# Patient Record
Sex: FEMALE | Race: WHITE | NOT HISPANIC OR LATINO | Employment: PART TIME | URBAN - METROPOLITAN AREA
[De-identification: names, ages, dates, MRNs, and addresses within clinical notes are randomized per-mention and may not be internally consistent; named-entity substitution may affect disease eponyms.]

---

## 2017-01-04 ENCOUNTER — APPOINTMENT (OUTPATIENT)
Dept: ONCOLOGY | Facility: HOSPITAL | Age: 47
End: 2017-01-04

## 2017-01-18 ENCOUNTER — INFUSION (OUTPATIENT)
Dept: ONCOLOGY | Facility: HOSPITAL | Age: 47
End: 2017-01-18

## 2017-01-18 ENCOUNTER — OFFICE VISIT (OUTPATIENT)
Dept: GYNECOLOGIC ONCOLOGY | Facility: CLINIC | Age: 47
End: 2017-01-18

## 2017-01-18 VITALS
BODY MASS INDEX: 30.34 KG/M2 | RESPIRATION RATE: 16 BRPM | SYSTOLIC BLOOD PRESSURE: 144 MMHG | WEIGHT: 188 LBS | HEART RATE: 78 BPM | OXYGEN SATURATION: 94 % | DIASTOLIC BLOOD PRESSURE: 90 MMHG | TEMPERATURE: 96.7 F

## 2017-01-18 DIAGNOSIS — C56.1 OVARIAN CANCER, RIGHT (HCC): Primary | ICD-10-CM

## 2017-01-18 LAB — CANCER AG125 SERPL QL: 28.8 U/ML (ref 0–30.2)

## 2017-01-18 PROCEDURE — 96523 IRRIG DRUG DELIVERY DEVICE: CPT

## 2017-01-18 PROCEDURE — 25010000002 HEPARIN FLUSH (PORCINE) 100 UNIT/ML SOLUTION: Performed by: OBSTETRICS & GYNECOLOGY

## 2017-01-18 PROCEDURE — 36591 DRAW BLOOD OFF VENOUS DEVICE: CPT

## 2017-01-18 PROCEDURE — 99213 OFFICE O/P EST LOW 20 MIN: CPT | Performed by: OBSTETRICS & GYNECOLOGY

## 2017-01-18 RX ORDER — VENLAFAXINE HYDROCHLORIDE 75 MG/1
75 CAPSULE, EXTENDED RELEASE ORAL DAILY
Qty: 30 CAPSULE | Refills: 3 | Status: SHIPPED | OUTPATIENT
Start: 2017-01-18 | End: 2017-05-10 | Stop reason: SDUPTHER

## 2017-01-18 RX ORDER — 0.9 % SODIUM CHLORIDE 0.9 %
10 VIAL (ML) INJECTION AS NEEDED
Status: CANCELLED | OUTPATIENT
Start: 2017-01-18

## 2017-01-18 RX ADMIN — HEPARIN 500 UNITS: 100 SYRINGE at 10:45

## 2017-01-18 NOTE — PROGRESS NOTES
Subjective     Reason for visit:  Surveillance visit    History of present illness:  The patient is a 46 y.o. 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  Her treatment history is summarized below.  She presents today for a surveillance visit.      Overall she has done well.  She denies abdominal pain and bloating.  No vaginal bleeding or discharge.  Denies nausea and vomiting.  Denies chest pain and SOB.  No fevers or chills.  She denies bowel or bladder dysfunction.  Her weight and appetite have been stable.  Her energy is stable.  No neuropathy.  No changes in her skin.  No issues with her port a cath other than minimal discomfort.  She has been coping well.  She continues to smoke 1/2 ppd.  Still having hot flashes.      Treatment History:    1.  She presented after having had leukocytosis for approximately 4-6 weeks and having received several courses of antibiotics as an outpatient for presumed pelvic etiology.  She also reported ongoing emesis since 12/2015.    2.  She was admitted the day prior to her planned gynecologic debulking surgery for anti-emetics and IV fluids.  Her leukocytosis on admission was 18.    3.  On 4/11/16 she underwent a laparotomy and debulking surgery for ovarian cancer.  Her surgery included a total abdominal hysterectomy, bilateral salpingoophorectomy, bilateral pelvic lymph node dissection, bilateral periaortic lymph node dissection, omentectomy, appendectomy, tumor debulking and peritoneal stripping to no gross residual visible disease.  She had a cholecystectomy performed by Dr. Leslie.  Intraoperatively she had surgical spill of her large right-sided ovarian mass.    4.  Final surgical pathology showed a 20 x 20 x 12.5 cm mucinous adenocarcinoma of the right ovary.  Her tumor was grade 2.  Given the intraoperative rupture, the stage is 1C1.  Washings collected prior to rupture were negative for malignant cells.  There was no ovarian surface involvement,  adhesions sampled to the sigmoid colon were negative.  There was no LVSI.  Right pelvic lymph nodes were 0/8, left pelvic lymph nodes 0/4, right periaortic lymph nodes 0/1, and 0/2 left periaortic lymph nodes.  The omentum was negative.  The appendix was negative.  The gallbladder showed chronic cholecystitis with with cholelithiasis.    5.  Postoperatively her white blood cell count slowly trended down.  She was discharged home on a course of Cipro and Flagyl.    6.  She underwent Port-A-Cath placement with Sony Xiao and Abel on 16.  Thereafter she was seen for mild superficial cellulitis at her port site.    7.  On 16 she received her first cycle of carboplatin, paclitaxel dosed at an every 3 week interval.  Her sixth and final cycle was on 16.  8.  Post-treatment CT scan at the end of 2016 showed only a small node in the left external iliac chain not meeting required criteria for malignant adenopathy and unchanged from pre-op scan.    OBGYN History:  status post one normal spontaneous vaginal delivery    Past Medical History   Diagnosis Date   • Abdominal pain    • Anxiety    • BMI 29.0-29.9,adult      BMI > = 18 - 29.6   • Chronic narcotic use      Chronic neck pain.  Dr. Mian Somers.   • Chronic neck pain    • Current every day smoker    • Dehydration    • Drug therapy    • Emesis 2016   • GERD (gastroesophageal reflux disease)    • Leukocytosis 2016   • Ovarian cancer 2016     Stage 1C1 mucinous adenocarcinoma     Past Surgical History   Procedure Laterality Date   • Total abdominal hysterectomy with salpingo oophorectomy Bilateral 2016     Ovarian cancer debulking   • Cholecystectomy open  2016   • Appendectomy  2016   • Venous access device (port) insertion Left 2016   • Mandible fracture surgery       jaw broke and reset     MEDICATIONS: The current medication list was reviewed with the patient and updated in the EMR this date per the Medical  Assistant. Medication dosages and frequencies were confirmed to be accurate.      Allergies:  has No Known Allergies.    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  She lives approximately an hour away.  She has a boyfriend and together they take care of 3 young children.  She continues to smoke half a pack a day.  She denies use of alcohol, and illicit drugs.     Family History:  Denies a history of breast, colon, endometrial, ovarian, and prostate cancer.  No history of melanoma.    Health Maintenance:    1. Mammogram - 10/2016  2. Colonoscopy - never    Review of Systems:  CONSTITUTIONAL:  Weight has been stable, no excessive fatigue.  No fever, chills, night sweats.  PSYCHIATRIC: +anxiety.  No history of depression. No bipolar disorder or insomnia.  EYES: Vision is unchanged.  No photophobia.  EARS, NOSE, MOUTH, AND THROAT: Hearing normal. No swallowing difficulties.  No sore throat.  ENDOCRINE: No history of diabetes or thyroid disease.  LYMPHATIC: No enlarged lymph nodes  RESPIRATORY: No shortness of breath, cough, asthma or wheezing.  No hemoptysis.  CARDIOVASCULAR: No angina, orthopnea, or edema.  No HTN.  No hyperlipidemia.  GASTROINTESTINAL: No constipation, diarrhea, or melena.  No reflux.  No nausea, vomiting.  GENITOURINARY: No dysuria, hematuria, urgency, or frequency.  NEUROLOGIC: +sciatica.  No numbness, weakness, motor disturbance, syncope, seizure, or headaches.  MUSCULOSKELETAL: No joint pain.  No muscle weakness.  INTEGUMENTARY: No new skin lesions.  GYNECOLOGIC: +hot flashes.  Per history of present illness.  HEMATOLOGIC: No bleeding problems.  Denies easy bruising.    Objective      Physical Exam  Visit Vitals   • /90   • Pulse 78   • Temp 96.7 °F (35.9 °C) (Temporal Artery )   • Resp 16   • Wt 188 lb (85.3 kg)   • LMP  (Approximate)   • SpO2 94%   • BMI 30.34 kg/m2     Body mass index is 30.34 kg/(m^2).    Wt Readings  from Last 3 Encounters:   01/18/17 188 lb (85.3 kg)   12/22/16 185 lb (83.9 kg)   11/17/16 185 lb (83.9 kg)     ECOG PS 0    GENERAL: Alert, well-appearing female in no apparent distress.  She appears her stated age.  She is here by herself.  HEENT: Sclera anicteric, normal eyelids. Head normocephalic, atraumatic. Mucus membranes moist.  Normal dentition.    NECK: Trachea midline, supple, without masses.  No thyromegaly.      BREASTS: Deferred  CHEST: PAC c/d/i   CARDIOVASCULAR: Normal rate, regular rhythm, no murmurs, rubs, or gallops.  Extremities symmetric.  No peripheral edema.  RESPIRATORY: Clear to auscultation bilaterally, normal respiratory effort  GASTROINTESTINAL:  Soft, non-tender, non-distended, no rebound or guarding, no masses, or hernias.  No HSM.  Incisions well-healed midline vertical laparotomy incision  MUSCULOSKELETAL:  Normal gait and station.  FROMx4.  No digital cyanosis.    SKIN:  Warm, dry, well-perfused.  All visible areas intact.  No rashes, lesions, ulcers.  PSYCHIATRIC: AO x3, with appropriate affect, normal thought processes  LYMPHATICS:  No cervical or inguinal adenopathy noted.  PELVIC exam:  Normal external female genitalia without lesions or skin changes.  Urethra midline.  Vagina without lesions.  Cuff intact, well-healed, and without visible lesions.  On bimanual exam vagina and cuff are smooth and without nodularity.  No palpable abdominal masses. Rectovaginal exam confirmatory. The pelvic sidewalls are smooth, the cul de sac is clear, the rectovaginal septum is supple.    Diagnostic Data:    Lab Results   Component Value Date     23.3 11/17/2016     23.1 08/31/2016     24.6 08/05/2016     30.4 (H) 07/20/2016     27.6 06/29/2016       Assessment/Plan  This is a 46 y.o. 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  Ovarian cancer: No evidence of disease.   today.  We previously discussed the  typical recommendation for ovarian cancer surveillance are every 3 months for the two years, every 4-6 months for for the third year, every 6 months for years 3-5, and annually thereafter.  We discussed need to monitor symptoms including any abdominal pain, bloating, changes in bowel or bladder habits.  She knows to be particularly aware of symptoms that occur several times a week for at least several weeks.  We also discussed the role of physical exam, CA-125, and imaging.     2.  Hot flashes: Change to Effexor XR and observe     3.  Smoking cessation: I discuss this at every visit.  She continues to smoke.  We discussed various strategies and alternatives coping mechanisms.    4.  Genetics:  Referral previously placed to Genetic Counseling.  She understands my recommendation to have this performed    5.  Routine health maintenance: Up to date on mammogram.  I will also perform clinical breast exams going forward, ideally off cycle from her mammogram.    6.  Port a cath:  Will remain in place for at least the first year.  She will have regularly scheduled port flushes.    7.  Follow-up: RTC in 3 months.  Eventually refer for survivorship.  She knows to call in the interim for any questions or concerns.          Electronically Signed by: Glenys Roman MD  Date: 1/18/2017      Time:  10:33 AM

## 2017-01-18 NOTE — MR AVS SNAPSHOT
Gertrudis Yost   1/18/2017 9:30 AM   Office Visit    Dept Phone:  [phone]   Encounter #:  [id number]    Provider:  Glenys Roman MD   Department:  [facility] GYNECOLOGIC ONCOLOGY                Your Full Care Plan              Today's Medication Changes          These changes are accurate as of: 1/18/17 10:07 AM.  If you have any questions, ask your nurse or doctor.               New Medication(s)Ordered:     venlafaxine XR 75 MG 24 hr capsule   Commonly known as:  EFFEXOR XR   Take 1 capsule by mouth Daily.   Replaces:  venlafaxine 37.5 MG tablet   Started by:  Glenys Roman MD         Stop taking medication(s)listed here:     venlafaxine 37.5 MG tablet   Commonly known as:  EFFEXOR   Replaced by:  venlafaxine XR 75 MG 24 hr capsule   Stopped by:  Glenys Roman MD                Where to Get Your Medications      These medications were sent to [address] AT  & [facility] - [phone] [facility] [phone] [address] [address]     Phone:  [phone]     venlafaxine XR 75 MG 24 hr capsule                  Your Updated Medication List          This list is accurate as of: 1/18/17 10:07 AM.  Always use your most recent med list.                gabapentin 400 MG capsule   Commonly known as:  NEURONTIN       lidocaine-prilocaine 2.5-2.5 % cream   Commonly known as:  EMLA       Morphine 15 MG 12 hr tablet   Commonly known as:  MS CONTIN       oxyCODONE-acetaminophen  MG per tablet   Commonly known as:  PERCOCET       venlafaxine XR 75 MG 24 hr capsule   Commonly known as:  EFFEXOR XR   Take 1 capsule by mouth Daily.               You Were Diagnosed With        Codes Comments    Ovarian cancer, right    -  Primary ICD-10-CM: C56.1  ICD-9-CM: 183.0       Instructions     None    Patient Instructions History      Upcoming Appointments     Visit Type Date Time  Department    FOLLOW UP 1 UNIT 2017  9:30 AM [facility]    PORT FLUSH 2017 10:30 AM [facility]    PORT FLUSH 2/15/2017  8:30 AM [facility]    FOLLOW UP 1 UNIT 2017  9:30 AM [facility]      MyChart Signup     [facility] [employer] allows you to send messages to your doctor, view your test results, renew your prescriptions, schedule appointments, and more. To sign up, go to [employer] and click on the Sign Up Now link in the New User? box. Enter your [employer] Activation Code exactly as it appears below along with the last four digits of your Social Security Number and your Date of Birth () to complete the sign-up process. If you do not sign up before the expiration date, you must request a new code.    [employer] Activation Code: [email or web address]  Expires: 2017 10:07 AM    If you have questions, you can email [email or web address] or call [phone] to talk to our [employer] staff. Remember, [employer] is NOT to be used for urgent needs. For medical emergencies, dial 911.               Other Info from Your Visit           Your Appointments     2017 10:30 AM EST   PORT FLUSH with CHAIR 13   [facility] OUTPATIENT ONCOLOGY ([place])    Cancer Center  [address] [place] [id number] [phone]            Feb 15, 2017  8:30 AM EST   PORT FLUSH with CHAIR 14   [facility] OUTPATIENT ONCOLOGY ([facility]    Cancer Center  [address] [place] [id number]   [phone]            2017  9:30 AM EDT   FOLLOW UP with Glenys Roman MD   [facility] GYNEC[facility]COL[facility]Y (--)    [address] [place] [id number]   [phone]              Allergies     No Known Allergies      Reason for Visit     Follow-up           Vital Signs     Blood Pressure Pulse Temperature Respirations Weight Last Menstrual Period    144/90 78  96.7 °F (35.9 °C) (Temporal Artery ) 16 188 lb (85.3 kg) (Approximate)    Oxygen Saturation Body Mass Index Smoking Status             94% 30.34 kg/m2 Current Every Day Smoker         Problems and Diagnoses Noted     Ovarian cancer

## 2017-01-19 ENCOUNTER — APPOINTMENT (OUTPATIENT)
Dept: ONCOLOGY | Facility: HOSPITAL | Age: 47
End: 2017-01-19

## 2017-02-15 ENCOUNTER — APPOINTMENT (OUTPATIENT)
Dept: ONCOLOGY | Facility: HOSPITAL | Age: 47
End: 2017-02-15

## 2017-02-15 ENCOUNTER — INFUSION (OUTPATIENT)
Dept: ONCOLOGY | Facility: HOSPITAL | Age: 47
End: 2017-02-15

## 2017-02-15 VITALS
RESPIRATION RATE: 16 BRPM | DIASTOLIC BLOOD PRESSURE: 91 MMHG | HEART RATE: 77 BPM | SYSTOLIC BLOOD PRESSURE: 132 MMHG | TEMPERATURE: 97.8 F

## 2017-02-15 DIAGNOSIS — C56.1 OVARIAN CANCER, RIGHT (HCC): Primary | ICD-10-CM

## 2017-02-15 PROCEDURE — 25010000002 HEPARIN FLUSH (PORCINE) 100 UNIT/ML SOLUTION: Performed by: OBSTETRICS & GYNECOLOGY

## 2017-02-15 PROCEDURE — 96523 IRRIG DRUG DELIVERY DEVICE: CPT

## 2017-02-15 RX ORDER — 0.9 % SODIUM CHLORIDE 0.9 %
10 VIAL (ML) INJECTION AS NEEDED
Status: CANCELLED | OUTPATIENT
Start: 2017-02-15

## 2017-02-15 RX ORDER — 0.9 % SODIUM CHLORIDE 0.9 %
10 VIAL (ML) INJECTION AS NEEDED
Status: DISCONTINUED | OUTPATIENT
Start: 2017-02-15 | End: 2017-02-15 | Stop reason: HOSPADM

## 2017-02-15 RX ADMIN — SODIUM CHLORIDE 10 ML: 9 INJECTION INTRAMUSCULAR; INTRAVENOUS; SUBCUTANEOUS at 08:58

## 2017-02-15 RX ADMIN — HEPARIN 500 UNITS: 100 SYRINGE at 08:58

## 2017-03-29 ENCOUNTER — INFUSION (OUTPATIENT)
Dept: ONCOLOGY | Facility: HOSPITAL | Age: 47
End: 2017-03-29

## 2017-03-29 VITALS
HEART RATE: 79 BPM | BODY MASS INDEX: 31.82 KG/M2 | RESPIRATION RATE: 16 BRPM | DIASTOLIC BLOOD PRESSURE: 84 MMHG | HEIGHT: 66 IN | TEMPERATURE: 97.6 F | SYSTOLIC BLOOD PRESSURE: 133 MMHG | WEIGHT: 198 LBS

## 2017-03-29 DIAGNOSIS — C56.1 OVARIAN CANCER, RIGHT (HCC): Primary | ICD-10-CM

## 2017-03-29 PROCEDURE — 25010000002 HEPARIN FLUSH (PORCINE) 100 UNIT/ML SOLUTION: Performed by: OBSTETRICS & GYNECOLOGY

## 2017-03-29 PROCEDURE — 96523 IRRIG DRUG DELIVERY DEVICE: CPT

## 2017-03-29 RX ORDER — 0.9 % SODIUM CHLORIDE 0.9 %
10 VIAL (ML) INJECTION AS NEEDED
Status: CANCELLED | OUTPATIENT
Start: 2017-03-29

## 2017-03-29 RX ADMIN — HEPARIN 500 UNITS: 100 SYRINGE at 08:29

## 2017-04-19 ENCOUNTER — APPOINTMENT (OUTPATIENT)
Dept: ONCOLOGY | Facility: HOSPITAL | Age: 47
End: 2017-04-19

## 2017-05-10 ENCOUNTER — INFUSION (OUTPATIENT)
Dept: ONCOLOGY | Facility: HOSPITAL | Age: 47
End: 2017-05-10

## 2017-05-10 ENCOUNTER — OFFICE VISIT (OUTPATIENT)
Dept: GYNECOLOGIC ONCOLOGY | Facility: CLINIC | Age: 47
End: 2017-05-10

## 2017-05-10 VITALS — DIASTOLIC BLOOD PRESSURE: 88 MMHG | HEART RATE: 78 BPM | SYSTOLIC BLOOD PRESSURE: 134 MMHG

## 2017-05-10 VITALS
DIASTOLIC BLOOD PRESSURE: 91 MMHG | OXYGEN SATURATION: 96 % | TEMPERATURE: 96.8 F | RESPIRATION RATE: 20 BRPM | WEIGHT: 199 LBS | SYSTOLIC BLOOD PRESSURE: 171 MMHG | HEART RATE: 83 BPM | BODY MASS INDEX: 32.12 KG/M2

## 2017-05-10 DIAGNOSIS — C56.1 OVARIAN CANCER, RIGHT (HCC): Primary | ICD-10-CM

## 2017-05-10 DIAGNOSIS — Z85.43 HISTORY OF OVARIAN CANCER: Primary | ICD-10-CM

## 2017-05-10 DIAGNOSIS — F17.200 CURRENT EVERY DAY SMOKER: ICD-10-CM

## 2017-05-10 DIAGNOSIS — Z95.828 PORT CATHETER IN PLACE: ICD-10-CM

## 2017-05-10 DIAGNOSIS — E66.09 NON MORBID OBESITY DUE TO EXCESS CALORIES: ICD-10-CM

## 2017-05-10 DIAGNOSIS — N95.1 HOT FLASH, MENOPAUSAL: ICD-10-CM

## 2017-05-10 PROCEDURE — 96523 IRRIG DRUG DELIVERY DEVICE: CPT

## 2017-05-10 PROCEDURE — 25010000002 HEPARIN FLUSH (PORCINE) 100 UNIT/ML SOLUTION: Performed by: OBSTETRICS & GYNECOLOGY

## 2017-05-10 PROCEDURE — 99214 OFFICE O/P EST MOD 30 MIN: CPT | Performed by: OBSTETRICS & GYNECOLOGY

## 2017-05-10 RX ORDER — LIDOCAINE AND PRILOCAINE 25; 25 MG/G; MG/G
1 CREAM TOPICAL AS NEEDED
Qty: 5 G | Refills: 2 | Status: SHIPPED | OUTPATIENT
Start: 2017-05-10 | End: 2018-04-24

## 2017-05-10 RX ORDER — HEPARIN SODIUM (PORCINE) LOCK FLUSH IV SOLN 100 UNIT/ML 100 UNIT/ML
500 SOLUTION INTRAVENOUS AS NEEDED
Status: CANCELLED | OUTPATIENT
Start: 2017-05-10

## 2017-05-10 RX ORDER — 0.9 % SODIUM CHLORIDE 0.9 %
10 VIAL (ML) INJECTION AS NEEDED
Status: CANCELLED | OUTPATIENT
Start: 2017-05-10

## 2017-05-10 RX ORDER — 0.9 % SODIUM CHLORIDE 0.9 %
10 VIAL (ML) INJECTION AS NEEDED
Status: DISCONTINUED | OUTPATIENT
Start: 2017-05-10 | End: 2017-05-10 | Stop reason: HOSPADM

## 2017-05-10 RX ORDER — VENLAFAXINE HYDROCHLORIDE 75 MG/1
75 CAPSULE, EXTENDED RELEASE ORAL DAILY
Qty: 30 CAPSULE | Refills: 6 | Status: SHIPPED | OUTPATIENT
Start: 2017-05-10 | End: 2019-01-30 | Stop reason: SDDI

## 2017-05-10 RX ADMIN — HEPARIN 500 UNITS: 100 SYRINGE at 10:51

## 2017-05-10 RX ADMIN — SODIUM CHLORIDE, PRESERVATIVE FREE 10 ML: 5 INJECTION INTRAVENOUS at 10:51

## 2017-06-21 ENCOUNTER — INFUSION (OUTPATIENT)
Dept: ONCOLOGY | Facility: HOSPITAL | Age: 47
End: 2017-06-21

## 2017-06-21 VITALS
RESPIRATION RATE: 16 BRPM | HEIGHT: 66 IN | BODY MASS INDEX: 31.98 KG/M2 | TEMPERATURE: 98.5 F | DIASTOLIC BLOOD PRESSURE: 93 MMHG | WEIGHT: 199 LBS | SYSTOLIC BLOOD PRESSURE: 134 MMHG | HEART RATE: 91 BPM

## 2017-06-21 DIAGNOSIS — M54.2 CHRONIC NECK PAIN: ICD-10-CM

## 2017-06-21 DIAGNOSIS — F41.9 ANXIETY: ICD-10-CM

## 2017-06-21 DIAGNOSIS — G89.29 CHRONIC NECK PAIN: ICD-10-CM

## 2017-06-21 DIAGNOSIS — Z85.43 HISTORY OF OVARIAN CANCER: Primary | ICD-10-CM

## 2017-06-21 DIAGNOSIS — F17.200 CURRENT EVERY DAY SMOKER: ICD-10-CM

## 2017-06-21 PROCEDURE — 25010000002 HEPARIN FLUSH (PORCINE) 100 UNIT/ML SOLUTION: Performed by: OBSTETRICS & GYNECOLOGY

## 2017-06-21 PROCEDURE — 96523 IRRIG DRUG DELIVERY DEVICE: CPT

## 2017-06-21 RX ORDER — SODIUM CHLORIDE 0.9 % (FLUSH) 0.9 %
10 SYRINGE (ML) INJECTION AS NEEDED
Status: DISCONTINUED | OUTPATIENT
Start: 2017-06-21 | End: 2017-06-21 | Stop reason: HOSPADM

## 2017-06-21 RX ORDER — SODIUM CHLORIDE 0.9 % (FLUSH) 0.9 %
10 SYRINGE (ML) INJECTION AS NEEDED
Status: CANCELLED | OUTPATIENT
Start: 2017-06-21

## 2017-06-21 RX ADMIN — Medication 10 ML: at 12:49

## 2017-06-21 RX ADMIN — HEPARIN 500 UNITS: 100 SYRINGE at 12:49

## 2017-08-09 ENCOUNTER — OFFICE VISIT (OUTPATIENT)
Dept: GYNECOLOGIC ONCOLOGY | Facility: CLINIC | Age: 47
End: 2017-08-09

## 2017-08-09 ENCOUNTER — INFUSION (OUTPATIENT)
Dept: ONCOLOGY | Facility: HOSPITAL | Age: 47
End: 2017-08-09

## 2017-08-09 VITALS
BODY MASS INDEX: 32.12 KG/M2 | DIASTOLIC BLOOD PRESSURE: 83 MMHG | RESPIRATION RATE: 20 BRPM | TEMPERATURE: 97.4 F | WEIGHT: 199 LBS | SYSTOLIC BLOOD PRESSURE: 139 MMHG | OXYGEN SATURATION: 98 % | HEART RATE: 76 BPM

## 2017-08-09 DIAGNOSIS — F41.9 ANXIETY: ICD-10-CM

## 2017-08-09 DIAGNOSIS — F17.200 CURRENT EVERY DAY SMOKER: ICD-10-CM

## 2017-08-09 DIAGNOSIS — M54.2 CHRONIC NECK PAIN: ICD-10-CM

## 2017-08-09 DIAGNOSIS — C56.1 OVARIAN CANCER, RIGHT (HCC): Primary | ICD-10-CM

## 2017-08-09 DIAGNOSIS — C56.9 OVARIAN CANCER, UNSPECIFIED LATERALITY (HCC): Primary | ICD-10-CM

## 2017-08-09 DIAGNOSIS — C56.9 OVARIAN CANCER, UNSPECIFIED LATERALITY (HCC): ICD-10-CM

## 2017-08-09 DIAGNOSIS — Z85.43 HISTORY OF OVARIAN CANCER: Primary | ICD-10-CM

## 2017-08-09 DIAGNOSIS — G89.29 CHRONIC NECK PAIN: ICD-10-CM

## 2017-08-09 LAB — CANCER AG125 SERPL QL: 27.9 U/ML (ref 0–30.2)

## 2017-08-09 PROCEDURE — 96523 IRRIG DRUG DELIVERY DEVICE: CPT

## 2017-08-09 PROCEDURE — 25010000002 HEPARIN FLUSH (PORCINE) 100 UNIT/ML SOLUTION: Performed by: OBSTETRICS & GYNECOLOGY

## 2017-08-09 PROCEDURE — 99213 OFFICE O/P EST LOW 20 MIN: CPT | Performed by: OBSTETRICS & GYNECOLOGY

## 2017-08-09 PROCEDURE — 36591 DRAW BLOOD OFF VENOUS DEVICE: CPT

## 2017-08-09 PROCEDURE — 86304 IMMUNOASSAY TUMOR CA 125: CPT

## 2017-08-09 RX ORDER — SODIUM CHLORIDE 0.9 % (FLUSH) 0.9 %
10 SYRINGE (ML) INJECTION AS NEEDED
Status: DISCONTINUED | OUTPATIENT
Start: 2017-08-09 | End: 2017-08-09 | Stop reason: HOSPADM

## 2017-08-09 RX ORDER — SODIUM CHLORIDE 0.9 % (FLUSH) 0.9 %
10 SYRINGE (ML) INJECTION AS NEEDED
Status: CANCELLED | OUTPATIENT
Start: 2017-08-09

## 2017-08-09 RX ADMIN — HEPARIN 500 UNITS: 100 SYRINGE at 11:52

## 2017-08-09 RX ADMIN — Medication 10 ML: at 11:52

## 2017-08-09 NOTE — PROGRESS NOTES
Gertrudis Yost  [id number]  1970    Subjective     Reason for visit:  Surveillance visit for history of ovarian cancer    History of present illness:  The patient is a 47 y.o. 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  Her treatment history is summarized below.  She presents today for a surveillance visit.      Overall she has done well.  She denies abdominal pain and bloating.  She has noted some new intermittent nausea which is new.  No vaginal bleeding or discharge.  Denies vomiting.  Denies chest pain and SOB.  No fevers or chills.  She denies bowel or bladder dysfunction.  Her weight and appetite have been stable.  Her energy is stable.  No neuropathy.  No changes in her skin.  No issues with her port a cath.  She has been coping well.  Hot flashes are better.    She continues to smoke 1/2 ppd.      Treatment History:    1.  She presented after having had leukocytosis for approximately 4-6 weeks and having received several courses of antibiotics as an outpatient for presumed pelvic etiology.  She also reported ongoing emesis since 12/2015.    2.  She was admitted the day prior to her planned gynecologic debulking surgery for anti-emetics and IV fluids.  Her leukocytosis on admission was 18.  OSH  was 147.  CEA was 1.9.  3.  On 4/11/16 she underwent a laparotomy and debulking surgery for ovarian cancer.  Her surgery included a total abdominal hysterectomy, bilateral salpingoophorectomy, bilateral pelvic lymph node dissection, bilateral periaortic lymph node dissection, omentectomy, appendectomy, tumor debulking and peritoneal stripping to no gross residual visible disease.  She had a cholecystectomy performed by Dr. Leslie.  Intraoperatively she had surgical spill of her large right-sided ovarian mass.    4.  Final surgical pathology showed a 20 x 20 x 12.5 cm mucinous adenocarcinoma of the right ovary.  Her tumor was grade 2.  Given the intraoperative rupture, the stage is  1C1.  Washings collected prior to rupture were negative for malignant cells.  There was no ovarian surface involvement, adhesions sampled to the sigmoid colon were negative.  There was no LVSI.  Right pelvic lymph nodes were 0/8, left pelvic lymph nodes 0/4, right periaortic lymph nodes 0/1, and 0/2 left periaortic lymph nodes.  The omentum was negative.  The appendix was negative.  The gallbladder showed chronic cholecystitis with with cholelithiasis.    5.  Postoperatively her white blood cell count slowly trended down.  She was discharged home on a course of Cipro and Flagyl.    6.  She underwent Port-A-Cath placement with Sony Xiao and Abel on 16.  Thereafter she was seen for mild superficial cellulitis at her port site.    7.  On 16 she received her first cycle of carboplatin, paclitaxel dosed at an every 3 week interval.  Her sixth and final cycle was on 16.  8.  Post-treatment CT scan at the end of 2016 showed only a small node in the left external iliac chain not meeting required criteria for malignant adenopathy and unchanged from pre-op scan.    OBGYN History:  status post one normal spontaneous vaginal delivery    Past Medical History:   Diagnosis Date   • Abdominal pain    • Anxiety    • BMI 29.0-29.9,adult     BMI > = 18 - 29.6   • Chronic narcotic use     Chronic neck pain.  Dr. Mian Somers.   • Chronic neck pain    • Current every day smoker    • Dehydration    • Drug therapy    • Emesis 2016   • GERD (gastroesophageal reflux disease)    • Leukocytosis 2016   • Ovarian cancer 2016    Stage 1C1 mucinous adenocarcinoma     Past Surgical History:   Procedure Laterality Date   • APPENDECTOMY  2016   • CHOLECYSTECTOMY OPEN  2016   • MANDIBLE FRACTURE SURGERY      jaw broke and reset   • TOTAL ABDOMINAL HYSTERECTOMY WITH SALPINGO OOPHORECTOMY Bilateral 2016    Ovarian cancer debulking   • VENOUS ACCESS DEVICE (PORT) INSERTION Left 2016      MEDICATIONS: The current medication list was reviewed with the patient and updated in the EMR this date per the Medical Assistant. Medication dosages and frequencies were confirmed to be accurate.      Allergies:  has No Known Allergies.    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  She lives approximately an hour away.  She has a boyfriend and together they take care of 3 young children.  She continues to smoke half a pack a day.  She denies use of alcohol, and illicit drugs.     Family History:  Denies a history of breast, colon, endometrial, ovarian, and prostate cancer.  No history of melanoma.    Health Maintenance:    1. Mammogram - 10/2016  2. Colonoscopy - never    Review of Systems:  CONSTITUTIONAL:  Weight has been stable, no excessive fatigue.  No fever, chills, night sweats.  PSYCHIATRIC: +anxiety.  No history of depression. No bipolar disorder or insomnia.  EYES: Vision is unchanged.  No photophobia.  EARS, NOSE, MOUTH, AND THROAT: Hearing normal. No swallowing difficulties.  No sore throat.  ENDOCRINE: No history of diabetes or thyroid disease.  LYMPHATIC: No enlarged lymph nodes  RESPIRATORY: No shortness of breath, cough, asthma or wheezing.  No hemoptysis.  CARDIOVASCULAR: No angina, orthopnea, or edema.  No HTN.  No hyperlipidemia.  GASTROINTESTINAL: No constipation, diarrhea, or melena.  No reflux.  +nausea, vomiting.  GENITOURINARY: No dysuria, hematuria, urgency, or frequency.  NEUROLOGIC: +sciatica.  No numbness, weakness, motor disturbance, syncope, seizure, or headaches.  MUSCULOSKELETAL: +back pain.  No muscle weakness.  INTEGUMENTARY: +stable angioma-appearing lesion on her right shin.  GYNECOLOGIC: +hot flashes.  Per history of present illness.  HEMATOLOGIC: No bleeding problems.  Denies easy bruising.    Objective      Physical Exam  /83  Pulse 76  Temp 97.4 °F (36.3 °C) (Temporal Artery )   Resp 20  Wt  199 lb (90.3 kg)  LMP  (Approximate)  SpO2 98%  BMI 32.12 kg/m2  Body mass index is 32.12 kg/(m^2).    Wt Readings from Last 3 Encounters:   08/09/17 199 lb (90.3 kg)   06/21/17 199 lb (90.3 kg)   05/10/17 199 lb (90.3 kg)     ECOG PS 0    GENERAL: Alert, well-appearing female in no apparent distress.  She appears her stated age.  She is here with two young children.  HEENT: Sclera anicteric, normal eyelids. Head normocephalic, atraumatic. Mucus membranes moist.  Normal dentition.    NECK: Trachea midline.  No obvious masses.      BREASTS: Deferred (Performed at last visit).    CHEST: PAC c/d/i   GASTROINTESTINAL:  Soft, non-tender, non-distended, no rebound or guarding, no masses, or hernias.  No HSM.  Incisions well-healed midline vertical laparotomy incision  MUSCULOSKELETAL:  Normal gait and station.  FROMx4.  No digital cyanosis.    SKIN:  Warm, dry, well-perfused.  All visible areas intact.  No rashes, lesions, ulcers.  PSYCHIATRIC: AO x3, with appropriate affect, normal thought processes  LYMPHATICS:  No cervical or inguinal adenopathy noted.  PELVIC exam:  Normal external female genitalia without lesions or skin changes.  Urethra midline.  Vagina without lesions.  Cuff intact, well-healed, and without visible lesions.  On bimanual exam vagina and cuff are smooth and without nodularity.  No palpable abdominal masses. Rectovaginal exam confirmatory. The pelvic sidewalls are smooth, the cul de sac is clear, the rectovaginal septum is supple.    Diagnostic Data:    Lab Results   Component Value Date     28.8 01/18/2017     23.3 11/17/2016     23.1 08/31/2016     24.6 08/05/2016     30.4 (H) 07/20/2016       Assessment/Plan  This is a 47 y.o. 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  Ovarian cancer:   -No evidence of disease.    - today  -We previously discussed the typical recommendation for ovarian cancer  surveillance are every 3 months for the two years, every 4-6 months for for the third year, every 6 months for years 3-5, and annually thereafter.  We discussed need to monitor symptoms including any abdominal pain, bloating, changes in bowel or bladder habits.  She knows to be particularly aware of symptoms that occur several times a week for at least several weeks.       2.  Smoking cessation: I discuss this at every visit.  She continues to smoke.  We discussed various strategies and she will consider these.    3.  Genetics:  Referral previously placed to Genetic Counseling.  Will attempt again today to get her scheduled.    4.  Routine health maintenance: Up to date on mammogram.  Clinical breast exam normal today once a year.    5.  Port a cath:    -Port flush today.    -Will schedule to have it removed in the next month (follow up  from today first).  Discussed risks and benefits of removal.    6.  Follow-up:   - today and then will schedule for port removal  -RTC in 3 months after port removal.    -Eventually refer for survivorship.    -Previously offered ACP.          Electronically Signed by: Glenys Roman MD  Date: 8/9/2017      Time:  11:19 AM

## 2017-08-10 ENCOUNTER — TELEPHONE (OUTPATIENT)
Dept: GYNECOLOGIC ONCOLOGY | Facility: CLINIC | Age: 47
End: 2017-08-10

## 2017-08-11 ENCOUNTER — PREP FOR SURGERY (OUTPATIENT)
Dept: GYNECOLOGIC ONCOLOGY | Facility: CLINIC | Age: 47
End: 2017-08-11

## 2017-08-11 NOTE — PATIENT INSTRUCTIONS
Outpatient Pre-op Patient Education  *See checked boxes for your instructions*    Gertrudis Yost  [id number]  1970    SURGEON: Dr. Roman    Appointment  [x]  1. Your surgery has been scheduled on 9-5-17 at [facility] [place] located at [address]. You will need to be there at 7:00 AM.   [] 2.  You have pre-admission testing (PAT) appointment on  at .  You will need to be at hospital registration 10 minutes before that time.  If your PAT appointment is on Sunday, please enter through the Emergency Department.   [] 3.  The registration department is located in the long hallway between the [address].       The Day(s) Before Surgery  [x] 1.  Do not drink alcohol or smoke.     [x] 2.  Do not take vitamins or aspirin one week before surgery.       [x] 3.  If you are ill on the days leading up to your surgery, please call our office.      [x] 4.  If you are using medications for diabetes, call the physician who manages these and get instructions on how they should be taken before and after surgery.    [x] 5.  Nothing to eat or drink after midnight on 9-4-17.      [x] 6.  Please make prior arrangements for someone to drive you home after your procedure        The Day of Surgery  [x] 1.  Do not eat, drink, or chew gum.     [x] 2.  On the morning of your surgery, you may take her prescription medications with a sip of water. Bring all medication with you to the surgery center. (Diabetic patients should bring insulin if instructed to do so by their diabetes managing physician).   [x] 3. Bathe or shower the morning of your surgery. Do not use powders, lotions, or creams. Deodorants are okay.     [x] 4. Wear loose, comfortable clothing.     [x] 5. Bring holders for glasses, contacts, or dentures.      [x] 6. Bring any required payment and forms, including insurance cards. Leave all money and valuables at home.        Post-surgery Instructions  [x] 1.  For the first 24 hours, rest  and take periodic deep breaths to remove anesthetic agents from your body.   [x] 2.  Follow any specific instructions relevant to your particular surgery.     [x] 3.  Limit activity to avoid stress to the surgical site.      [x] 4.  Keep all dressings dry. Shower or bathe as instructed by your doctor.     [x] 5.  Drink and eat light foods. Remain on liquids alone only if nausea and vomiting occur. Return to your regular diet gradually, as tolerance allows.    [x] 6. Avoid alcohol for at least 24 hours.      [x] 7.  Take prescription pain medication as directed and with food.      [x] 8.  Call our office after discharge from the surgery center to make your post-op appointment.        In Case of Emergency:  Call our office if you experience any of the following:  - Excessive drainage, bleeding, swelling, or redness at the incision site  - Severe pain not eased by pain medication  - Temperature above 101  - Persistent nausea or vomiting  - Skin rash or general body itching

## 2017-08-16 ENCOUNTER — DOCUMENTATION (OUTPATIENT)
Dept: GYNECOLOGIC ONCOLOGY | Facility: CLINIC | Age: 47
End: 2017-08-16

## 2017-08-16 NOTE — PROGRESS NOTES
GYN Oncology    Per office staff, patient has been scheduled for her port removal in [facility] on 9-5-17.     Glenys Roman MD  08/16/17  11:09 AM

## 2017-08-22 ENCOUNTER — TELEPHONE (OUTPATIENT)
Dept: GYNECOLOGIC ONCOLOGY | Facility: CLINIC | Age: 47
End: 2017-08-22

## 2017-08-22 NOTE — TELEPHONE ENCOUNTER
Patient phoned office requesting her date for port removal with Dr. Roman be changed.  Date changed from 9-5-17 to 9-7-17 per pt request.

## 2017-08-31 ENCOUNTER — CLINICAL SUPPORT (OUTPATIENT)
Dept: GENETICS | Facility: HOSPITAL | Age: 47
End: 2017-08-31

## 2017-08-31 ENCOUNTER — INFUSION (OUTPATIENT)
Dept: ONCOLOGY | Facility: HOSPITAL | Age: 47
End: 2017-08-31

## 2017-08-31 VITALS
HEART RATE: 71 BPM | SYSTOLIC BLOOD PRESSURE: 133 MMHG | HEIGHT: 66 IN | BODY MASS INDEX: 32.62 KG/M2 | TEMPERATURE: 97.4 F | WEIGHT: 203 LBS | DIASTOLIC BLOOD PRESSURE: 84 MMHG | RESPIRATION RATE: 18 BRPM

## 2017-08-31 DIAGNOSIS — Z85.43 HISTORY OF OVARIAN CANCER: Primary | ICD-10-CM

## 2017-08-31 DIAGNOSIS — G89.29 CHRONIC NECK PAIN: ICD-10-CM

## 2017-08-31 DIAGNOSIS — Z80.3 FAMILY HISTORY OF BREAST CANCER: ICD-10-CM

## 2017-08-31 DIAGNOSIS — F17.200 CURRENT EVERY DAY SMOKER: ICD-10-CM

## 2017-08-31 DIAGNOSIS — C56.1 OVARIAN CANCER, RIGHT (HCC): Primary | ICD-10-CM

## 2017-08-31 DIAGNOSIS — F41.9 ANXIETY: ICD-10-CM

## 2017-08-31 DIAGNOSIS — Z85.828 HISTORY OF BASAL CELL CARCINOMA: ICD-10-CM

## 2017-08-31 DIAGNOSIS — Z80.8 FAMILY HISTORY OF SKIN CANCER: ICD-10-CM

## 2017-08-31 DIAGNOSIS — M54.2 CHRONIC NECK PAIN: ICD-10-CM

## 2017-08-31 PROCEDURE — 25010000002 HEPARIN FLUSH (PORCINE) 100 UNIT/ML SOLUTION: Performed by: OBSTETRICS & GYNECOLOGY

## 2017-08-31 PROCEDURE — 36591 DRAW BLOOD OFF VENOUS DEVICE: CPT

## 2017-08-31 PROCEDURE — 96523 IRRIG DRUG DELIVERY DEVICE: CPT

## 2017-08-31 RX ORDER — SODIUM CHLORIDE 0.9 % (FLUSH) 0.9 %
10 SYRINGE (ML) INJECTION AS NEEDED
Status: DISCONTINUED | OUTPATIENT
Start: 2017-08-31 | End: 2017-08-31 | Stop reason: HOSPADM

## 2017-08-31 RX ORDER — SODIUM CHLORIDE 0.9 % (FLUSH) 0.9 %
10 SYRINGE (ML) INJECTION AS NEEDED
Status: CANCELLED | OUTPATIENT
Start: 2017-08-31

## 2017-08-31 RX ADMIN — HEPARIN 500 UNITS: 100 SYRINGE at 11:12

## 2017-08-31 RX ADMIN — Medication 10 ML: at 11:12

## 2017-09-01 ENCOUNTER — TELEPHONE (OUTPATIENT)
Dept: GYNECOLOGIC ONCOLOGY | Facility: CLINIC | Age: 47
End: 2017-09-01

## 2017-09-01 NOTE — TELEPHONE ENCOUNTER
Orders successfully to [facility] for pt's upcoming outpatient procedure with Dr. Roman.

## 2017-09-01 NOTE — PROGRESS NOTES
Gertrudis Yost, a 47-year-old female, was referred for genetic counseling due to a personal history of ovarian cancer.  Ms. Yost was diagnosed with ovarian cancer at 45 years of age. She also has a history of basal cell carcinoma diagnosed at age 37. Ms. Yost was interested in discussing her risk for a hereditary cancer syndrome and opted to pursue a multigene panel. The OvaNext panel was ordered which includes 25 genes associated with an increased risk for ovarian cancer (SATINDER, BARD1, BRCA1, BRCA2, BRIP1, CDH1, CHEK2, EPCAM, DICER1, MLH1, MRE11A, MSH2, MSH6, MUTYH, NBN, NF1, PALB2, PMS2, PTEN, RAD50, RAD51C, RAD51D, SMARCA4, STK11, and TP53).  Results are expected in 2-3 weeks.    PERTINENT FAMILY HISTORY: (See attached pedigree)  Brother:  Skin cancer, 45  Mat. Grandmother: Breast cancer, 60s  Pat. Uncle:  Unknown cancer primary      We do not have medical records regarding the diagnoses in Ms. Yost’s family.    RISK ASSESSMENT:  Ms. Yost’s personal history of ovarian cancer raised the question of a hereditary cancer syndrome. NCCN guidelines recommend offering BRCA1/2 testing to any individual with ovarian cancer, regardless of age of diagnosis or family history.  Ms. Yost clearly meets NCCN guidelines for genetic testing. This risk assessment is based on the family history information provided at the time of the appointment.  The assessment could change in the future should new information be obtained.    GENETIC COUNSELING: (30 minutes) We reviewed the family history information in detail.  Cases of cancer follow three general patterns: sporadic, familial, and hereditary.  While most cancer is sporadic, some cases appear to occur in family clusters.  These cases are said to be familial and account for 10-20% of cancer cases.  Familial cases may be due to a combination of shared genes and environmental factors among family members.  In even fewer families, the cancer is said to be inherited, and the  genes responsible for the cancer are known.      The pedigree patterns observed in sporadic versus hereditary cancer families were reviewed.  Family histories typical of hereditary cancer syndromes usually include multiple first- and second-degree relatives diagnosed with cancer types that define a syndrome.  These cases tend to be diagnosed at younger-than-expected ages and can be bilateral or multifocal.  The cancer in these families follows an autosomal dominant inheritance pattern, which indicates the likely presence of a mutation in a cancer susceptibility gene.  Children and siblings of an individual believed to carry this mutation have a 50% chance of inheriting that mutation, thereby inheriting the increased risk to develop cancer.    Hereditary ovarian cancer accounts for 5-10% of all cases of ovarian cancer.  A significant proportion of hereditary breast and ovarian cancer can be attributed to mutations in the BRCA1 and BRCA2 genes.  The cancer in these families follows an autosomal dominant pattern of inheritance.  Mutations in these genes confer an increased risk for breast cancer, ovarian cancer, male breast cancer, prostate cancer and pancreatic cancer.  Women with a BRCA1 or BRCA2 mutation have up to an 87% lifetime risk of breast cancer and up to a 44% risk of ovarian cancer.    There are other, more rare, hereditary cancer syndromes. Some of these conditions have well defined cancer risks and established management guidelines.  Other genes that can be tested for have been more recently described, and there may be less data regarding the risks and therefore may not have established management guidelines.  We discussed these limitations at length. Based on Ms. Yost’s personal and family history and her desire to get more information regarding her personal risks and potential risks for her family, she opted to pursue testing through a panel evaluating several other genes known to increase the risk  for cancer.    GENETIC TESTING:  The risks, benefits, and limitations of genetic testing and implications for clinical management following testing were reviewed.  DNA test results can influence decisions regarding screening, prevention, and surgical management.  Genetic testing can have significant psychological implications for both individuals and families.  Also discussed was the possibility of employment and insurance discrimination based on genetic test results and the laws in place to prevent this.      We discussed panel testing, which would involve testing for BRCA1/2 and 23 additional genes that impact the risk for ovarian cancer and other cancers (additional genes include SATINDER, BARD1, BRIP1, CDH1, CHEK2, DICER1, EPCAM, MLH1, MRE11A, MSH2, MSH6, MUTYH, NBN, NF1, PALB2, PMS2, PTEN, RAD50, RAD51C, RAD51D, SMARCA4, STK11, and TP53). The implications of a positive or negative test result were discussed.  We also discussed the possibility that, in some cases, genetic test results may be informative or may be ambiguous due to the identification of a genetic variant.  These variants may or may not be associated with an increased cancer risk.  The limited value of negative test results was emphasized, particularly given the significant population risk for cancer and the existence of other cancer susceptibility genes.  Given her personal history, a negative test result would not eliminate all cancer risk to her family members, although the risk would not be as high as it would with positive genetic testing.      PLAN: Results should be available in 2-3 weeks, and Ms. Yost will be contacted by telephone to discuss the results at that time.  She is welcome to contact us in the meantime with any questions or concerns.     Vicki Boo MS  Genetic Counselor

## 2017-09-07 ENCOUNTER — OUTSIDE FACILITY SERVICE (OUTPATIENT)
Dept: GYNECOLOGIC ONCOLOGY | Facility: CLINIC | Age: 47
End: 2017-09-07

## 2017-09-07 PROCEDURE — 36590 REMOVAL TUNNELED CV CATH: CPT | Performed by: OBSTETRICS & GYNECOLOGY

## 2017-09-13 ENCOUNTER — TELEPHONE (OUTPATIENT)
Dept: GYNECOLOGIC ONCOLOGY | Facility: CLINIC | Age: 47
End: 2017-09-13

## 2017-09-13 NOTE — TELEPHONE ENCOUNTER
This patient called and said a piece of cloth is still   Present where her port was removed.   She wants to know if it should be there.     Please call     09/13/17 at 11:01am:  I returned pt's call.  She said there is gauze stuck to the surgical glue used to close the incision and it hurts when she tries to pull it off.  She has showered several times and the gauze remains stuck to the glue.  Port was removed 09/07/17.  I advised she leave the gauze alone and it should come off when the glue natrually begins coming off on its own, usually 10 - 14 days.  She verbalized understanding.

## 2017-09-15 ENCOUNTER — TRANSCRIBE ORDERS (OUTPATIENT)
Dept: ADMINISTRATIVE | Facility: HOSPITAL | Age: 47
End: 2017-09-15

## 2017-09-15 DIAGNOSIS — N64.4 BREAST PAIN, LEFT: Primary | ICD-10-CM

## 2017-09-26 ENCOUNTER — DOCUMENTATION (OUTPATIENT)
Dept: GENETICS | Facility: HOSPITAL | Age: 47
End: 2017-09-26

## 2017-09-26 NOTE — PROGRESS NOTES
Gretrudis Yost, a 47-year-old female, was referred for genetic counseling due to a personal history of ovarian cancer.  Ms. Yost was diagnosed with ovarian cancer at 45 years of age. She also has a history of basal cell carcinoma diagnosed at age 37. Ms. Yost was interested in discussing her risk for a hereditary cancer syndrome and opted to pursue a multigene panel. The OvaNext panel was ordered which includes 25 genes associated with an increased risk for ovarian cancer (SATINDER, BARD1, BRCA1, BRCA2, BRIP1, CDH1, CHEK2, EPCAM, DICER1, MLH1, MRE11A, MSH2, MSH6, MUTYH, NBN, NF1, PALB2, PMS2, PTEN, RAD50, RAD51C, RAD51D, SMARCA4, STK11, and TP53). 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 These positive results were discussed with Ms. Yost by telephone on 9/21/17, and she is scheduled for a follow-up appointment to further discuss these results on 10/3/17.     PERTINENT FAMILY HISTORY: (See attached pedigree)  Brother:  Skin cancer, 45  Mat. Grandmother: Breast cancer, 60s  Pat. Uncle:  Unknown cancer primary      We do not have medical records regarding the diagnoses in Ms. Yost’s family.    RISK ASSESSMENT:  Ms. Yost’s personal history of ovarian cancer raised the question of a hereditary cancer syndrome. NCCN guidelines recommend offering BRCA1/2 testing to any individual with ovarian cancer, regardless of age of diagnosis or family history.  Ms. Yost clearly meets NCCN guidelines for genetic testing. This risk assessment is based on the family history information provided at the time of the appointment.  The assessment could change in the future should new information be obtained.    GENETIC COUNSELING: (30 minutes) We reviewed the family history information in detail.  Cases of cancer follow three general patterns: sporadic, familial, and hereditary.  While most cancer is sporadic, some cases appear to occur in family clusters.  These cases are  said to be familial and account for 10-20% of cancer cases.  Familial cases may be due to a combination of shared genes and environmental factors among family members.  In even fewer families, the cancer is said to be inherited, and the genes responsible for the cancer are known.      The pedigree patterns observed in sporadic versus hereditary cancer families were reviewed.  Family histories typical of hereditary cancer syndromes usually include multiple first- and second-degree relatives diagnosed with cancer types that define a syndrome.  These cases tend to be diagnosed at younger-than-expected ages and can be bilateral or multifocal.  The cancer in these families follows an autosomal dominant inheritance pattern, which indicates the likely presence of a mutation in a cancer susceptibility gene.  Children and siblings of an individual believed to carry this mutation have a 50% chance of inheriting that mutation, thereby inheriting the increased risk to develop cancer.    Hereditary ovarian cancer accounts for 5-10% of all cases of ovarian cancer.  A significant proportion of hereditary breast and ovarian cancer can be attributed to mutations in the BRCA1 and BRCA2 genes.  The cancer in these families follows an autosomal dominant pattern of inheritance.  Mutations in these genes confer an increased risk for breast cancer, ovarian cancer, male breast cancer, prostate cancer and pancreatic cancer.  Women with a BRCA1 or BRCA2 mutation have up to an 87% lifetime risk of breast cancer and up to a 44% risk of ovarian cancer.    There are other, more rare, hereditary cancer syndromes. Some of these conditions have well defined cancer risks and established management guidelines.  Other genes that can be tested for have been more recently described, and there may be less data regarding the risks and therefore may not have established management guidelines.  We discussed these limitations at length. Based on Ms.  Priyank’s personal and family history and her desire to get more information regarding her personal risks and potential risks for her family, she opted to pursue testing through a panel evaluating several other genes known to increase the risk for cancer.    GENETIC TESTING:  The risks, benefits, and limitations of genetic testing and implications for clinical management following testing were reviewed.  DNA test results can influence decisions regarding screening, prevention, and surgical management.  Genetic testing can have significant psychological implications for both individuals and families.  Also discussed was the possibility of employment and insurance discrimination based on genetic test results and the laws in place to prevent this.      We discussed panel testing, which would involve testing for BRCA1/2 and 23 additional genes that impact the risk for ovarian cancer and other cancers (additional genes include SATINDER, BARD1, BRIP1, CDH1, CHEK2, DICER1, EPCAM, MLH1, MRE11A, MSH2, MSH6, MUTYH, NBN, NF1, PALB2, PMS2, PTEN, RAD50, RAD51C, RAD51D, SMARCA4, STK11, and TP53). The implications of a positive or negative test result were discussed.  We also discussed the possibility that, in some cases, genetic test results may be informative or may be ambiguous due to the identification of a genetic variant.  These variants may or may not be associated with an increased cancer risk.  The limited value of negative test results was emphasized, particularly given the significant population risk for cancer and the existence of other cancer susceptibility genes.  Given her personal history, a negative test result would not eliminate all cancer risk to her family members, although the risk would not be as high as it would with positive genetic testing.      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 This is consistent with a hereditary risk for  breast cancer. Genetic testing for the CHEK2 gene is indicated for other family members to determine whether they are at an increased risk for breast and colon cancer. Siblings and children each have a 50% chance of having inherited this mutation.  Relatives would need a copy of Ms. Yost’s result to ensure they were being tested for the correct mutation.      Until each at-risk family member has been proven not to carry this CHEK2 mutation, they could be offered increased surveillance.  We would be happy to see family members who live in the [facility] or in [facility] in our clinic to further discuss this information and testing options. They can call our office at [phone] to schedule an appointment. For family members who live elsewhere, there are genetic counselors at most [facility] centers. They can find a genetic counselor by visiting the National Society of Genetic Counselors website at www.nsgc.org or they can call our office and we would be happy to give them the contact information of the closest genetic counselor.      Cancer Risk: Mutations in CHEK2 have been estimated to increase the risk of female breast cancer to somewhere between 24-48%, dependent on the family history.   Data from one study showed that for women who have been diagnosed with breast cancer, there is up to a 29% risk of a second breast cancer primary within 10 years of the first diagnosis.     Some studies have suggested a possible increased risk for colorectal cancer for men and women with a CHEK2 mutation. The lifetime risk for colorectal cancer is estimated to be up to 9.5% compared to the average risk of 3-4%. Men have been shown to have an increased lifetime risk to develop prostate cancer. Some studies have described a possible increased risk for a wide range of cancers in patients with CHEK2 mutations, however these studies are not conclusive and there are no medical management guidelines to address these  possible risks at this time.    Cancer Screening:  Options available to individuals with a CHEK2 mutation and at high risk for breast cancer were discussed, including increased surveillance, chemoprevention, and preventative surgery.    For women with a CHEK2 mutation who have not had a breast cancer diagnosis, increased breast cancer surveillance and chemoprevention are warranted.  Increased surveillance, based on NCCN guidelines, would consist of semi-annual clinical breast exam and monthly self-breast exam starting at age 18 and annual mammography and breast MRI starting at age 40. Breast cancer chemoprevention is another option that relatives may wish to consider in the future.  Studies have shown that Tamoxifen and Raloxifene can cut the risk of estrogen receptor positive breast cancer by 50% when taken by high-risk women. There are risks associated with these medications; therefore the risks versus benefits must be considered prior to deciding to take chemopreventative medications.  For women who have not had a breast cancer diagnosis, the NCCN guidelines state that there is not currently evidence to make a recommendation for prophylactic risk reducing mastectomy for CHEK2 mutation carriers.  However, depending on family history, preventative bilateral mastectomy is still considered by some women that test positive for a CHEK2 mutation, and this reduces breast cancer risk by approximately 90%.    Current NCCN screening guidelines recommend that individuals with a CHEK2 mutation begin colonoscopies at 40 years of age and repeat every 5 years.      PLAN:   Ms. Yost is currently being followed in Gynecologic Oncology for her ovarian cancer. Ms. Yost can additionally be seen in the Cancer Risk Management Clinic, located within the same office, for coordination of breast screening in the future. Ms. Yost is scheduled for a mammogram October 3, 2017. Ms. Yost and her family are welcome to contact us with  any additional questions or concerns.       Vicki Boo MS  Genetic Counselor       Cc: MD Gertrudis Yuen

## 2017-10-03 ENCOUNTER — HOSPITAL ENCOUNTER (OUTPATIENT)
Dept: ULTRASOUND IMAGING | Facility: HOSPITAL | Age: 47
Discharge: HOME | End: 2017-10-03

## 2017-10-03 ENCOUNTER — HOSPITAL ENCOUNTER (OUTPATIENT)
Dept: MAMMOGRAPHY | Facility: HOSPITAL | Age: 47
Discharge: HOME | End: 2017-10-03
Attending: OBSTETRICS & GYNECOLOGY | Admitting: OBSTETRICS & GYNECOLOGY

## 2017-10-03 DIAGNOSIS — R92.8 ABNORMAL MAMMOGRAM: ICD-10-CM

## 2017-10-03 DIAGNOSIS — N64.4 BREAST PAIN, LEFT: ICD-10-CM

## 2017-10-03 PROCEDURE — 77062 BREAST TOMOSYNTHESIS BI: CPT | Performed by: RADIOLOGY

## 2017-10-03 PROCEDURE — 77066 DX MAMMO INCL CAD BI: CPT | Performed by: RADIOLOGY

## 2017-10-03 PROCEDURE — G0204 DX MAMMO INCL CAD BI: HCPCS

## 2017-10-03 PROCEDURE — G0279 TOMOSYNTHESIS, MAMMO: HCPCS

## 2017-10-03 PROCEDURE — 76642 ULTRASOUND BREAST LIMITED: CPT | Performed by: RADIOLOGY

## 2017-10-03 PROCEDURE — 76642 ULTRASOUND BREAST LIMITED: CPT

## 2017-10-11 ENCOUNTER — TELEPHONE (OUTPATIENT)
Dept: GENETICS | Facility: HOSPITAL | Age: 47
End: 2017-10-11

## 2017-10-11 NOTE — TELEPHONE ENCOUNTER
I called Gertrudis Yost to discuss her genetic test results further since she was unable to make it to the scheduled follow-up appointment on 10/3/17. Ms. Yost indicated she would like to schedule a follow-up appointment to discuss the results on 10/25/17 since she has an appointment already scheduled on this date with Dr. Roman. I scheduled her for a genetics follow-up appointment to discuss results on 10/25/17 at 11:30am, following her appointment in Gyn Onc.    Vicki Boo MS  Genetic Counselor

## 2017-10-25 ENCOUNTER — CLINICAL SUPPORT (OUTPATIENT)
Dept: GENETICS | Facility: HOSPITAL | Age: 47
End: 2017-10-25

## 2017-10-25 ENCOUNTER — OFFICE VISIT (OUTPATIENT)
Dept: GYNECOLOGIC ONCOLOGY | Facility: CLINIC | Age: 47
End: 2017-10-25

## 2017-10-25 VITALS
SYSTOLIC BLOOD PRESSURE: 121 MMHG | BODY MASS INDEX: 32.93 KG/M2 | WEIGHT: 204 LBS | OXYGEN SATURATION: 95 % | RESPIRATION RATE: 14 BRPM | DIASTOLIC BLOOD PRESSURE: 78 MMHG | HEART RATE: 85 BPM | TEMPERATURE: 97.9 F

## 2017-10-25 DIAGNOSIS — F17.200 CURRENT EVERY DAY SMOKER: ICD-10-CM

## 2017-10-25 DIAGNOSIS — Z85.43 HISTORY OF OVARIAN CANCER: Primary | ICD-10-CM

## 2017-10-25 DIAGNOSIS — Z15.09 MONOALLELIC MUTATION OF CHEK2 GENE IN FEMALE PATIENT: Primary | ICD-10-CM

## 2017-10-25 DIAGNOSIS — R89.8 ABNORMAL GENETIC TEST: ICD-10-CM

## 2017-10-25 DIAGNOSIS — Z15.89 MONOALLELIC MUTATION OF CHEK2 GENE IN FEMALE PATIENT: Primary | ICD-10-CM

## 2017-10-25 DIAGNOSIS — Z15.02 MONOALLELIC MUTATION OF CHEK2 GENE IN FEMALE PATIENT: Primary | ICD-10-CM

## 2017-10-25 DIAGNOSIS — Z15.01 MONOALLELIC MUTATION OF CHEK2 GENE IN FEMALE PATIENT: Primary | ICD-10-CM

## 2017-10-25 PROCEDURE — 99214 OFFICE O/P EST MOD 30 MIN: CPT | Performed by: OBSTETRICS & GYNECOLOGY

## 2017-10-25 NOTE — PROGRESS NOTES
Ms. Yost returned for follow-up to review genetic test results which identified a CHEK2 mutation.  See summary note from 9/26/17. Ms. Yost’s most recent mammogram and breast ultrasound was on 10/3/17. Ms. Yost has never had a colonoscopy. Ms. Yost stated that her daughter is interested in pursuing genetic testing soon, and I discussed that we would be happy to see her daughter and other relatives for testing. Ms. Yost is returning for follow-up in 3 months with NELSON Tanner as part of the Cancer Risk Management Clinic to coordinate increased breast screening, colonoscopy, and follow up care for her ongoing ovarian cancer surveillance. Ms. Yost was able to confirm understanding of results and implications for herself and family members.  We encourage Ms. Yost to contact us with any questions or concerns, and to recontact genetics periodically for any updates in cancer risks or guidelines for CHEK2 mutation carriers.

## 2017-10-25 NOTE — PROGRESS NOTES
Gertrudis Yost  [id number]  1970    Subjective     Reason for visit:  Surveillance visit for history of ovarian cancer    History of present illness:  The patient is a 47 y.o. 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  Her treatment history is summarized below.  She presents today for a surveillance visit and to meet with genetic counseling to discuss the chek-2 diagnosis.      Overall she has done well.  She denies abdominal pain and bloating.  No nausea or vomitting.  No vaginal bleeding or discharge. Denies chest pain and SOB.  No fevers or chills.  She denies bowel or bladder dysfunction.  Her weight and appetite have been stable.  Her energy is stable.  No neuropathy.  No changes in her skin.  No issues with her port a cath.  She has been coping well.  Hot flashes are better.    She stopped smoking cigarettes but is now using e-cigarettes.      Treatment History:    1.  She presented after having had leukocytosis for approximately 4-6 weeks and having received several courses of antibiotics as an outpatient for presumed pelvic etiology.  She also reported ongoing emesis since 12/2015.    2.  She was admitted the day prior to her planned gynecologic debulking surgery for anti-emetics and IV fluids.  Her leukocytosis on admission was 18.  OSH  was 147.  CEA was 1.9.  3.  On 4/11/16 she underwent a laparotomy and debulking surgery for ovarian cancer.  Her surgery included a total abdominal hysterectomy, bilateral salpingoophorectomy, bilateral pelvic lymph node dissection, bilateral periaortic lymph node dissection, omentectomy, appendectomy, tumor debulking and peritoneal stripping to no gross residual visible disease.  She had a cholecystectomy performed by Dr. Leslie.  Intraoperatively she had surgical spill of her large right-sided ovarian mass.    4.  Final surgical pathology showed a 20 x 20 x 12.5 cm mucinous adenocarcinoma of the right ovary.  Her tumor was grade 2.   Given the intraoperative rupture, the stage is 1C1.  Washings collected prior to rupture were negative for malignant cells.  There was no ovarian surface involvement, adhesions sampled to the sigmoid colon were negative.  There was no LVSI.  Right pelvic lymph nodes were 0/8, left pelvic lymph nodes 0/4, right periaortic lymph nodes 0/1, and 0/2 left periaortic lymph nodes.  The omentum was negative.  The appendix was negative.  The gallbladder showed chronic cholecystitis with with cholelithiasis.    5.  Postoperatively her white blood cell count slowly trended down.  She was discharged home on a course of Cipro and Flagyl.    6.  She underwent Port-A-Cath placement with Sony Xiao and Abel on 16.  Thereafter she was seen for mild superficial cellulitis at her port site.    7.  On 16 she received her first cycle of carboplatin, paclitaxel dosed at an every 3 week interval.  Her sixth and final cycle was on 16.  8.  Post-treatment CT scan at the end of 2016 showed only a small node in the left external iliac chain not meeting required criteria for malignant adenopathy and unchanged from pre-op scan.  9.  Had genetic counseling and testing and tested positive for CHEK2 gene (c.470T>C)    OBGYN History:  status post one normal spontaneous vaginal delivery    Past Medical History:   Diagnosis Date   • Abdominal pain    • Anxiety    • BMI 29.0-29.9,adult     BMI > = 18 - 29.6   • Chronic narcotic use     Chronic neck pain.  Dr. Mian Somers.   • Chronic neck pain    • Current every day smoker    • Dehydration    • Drug therapy    • Emesis 2016   • GERD (gastroesophageal reflux disease)    • Leukocytosis 2016   • Ovarian cancer 2016    Stage 1C1 mucinous adenocarcinoma     Past Surgical History:   Procedure Laterality Date   • APPENDECTOMY  2016   • CHOLECYSTECTOMY OPEN  2016   • MANDIBLE FRACTURE SURGERY      jaw broke and reset   • TOTAL ABDOMINAL HYSTERECTOMY  WITH SALPINGO OOPHORECTOMY Bilateral 04/2016    Ovarian cancer debulking   • VENOUS ACCESS DEVICE (PORT) INSERTION Left 05/2016     MEDICATIONS: The current medication list was reviewed with the patient and updated in the EMR this date per the Medical Assistant. Medication dosages and frequencies were confirmed to be accurate.      Allergies:  has No Known Allergies.    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  She lives approximately an hour away.  She has a boyfriend and together they take care of 3 young children.  She I smoking e-cigarettes.  She denies use of alcohol, and illicit drugs.     Family History:  Denies a history of breast, colon, endometrial, ovarian, and prostate cancer.  No history of melanoma.    Health Maintenance:    1. Mammogram - 9/2017  2. Colonoscopy - never    Review of Systems:  CONSTITUTIONAL:  Weight has been stable, no excessive fatigue.  No fever, chills, night sweats.  PSYCHIATRIC: +anxiety.  No history of depression. No bipolar disorder or insomnia.  EYES: Vision is unchanged.  No photophobia.  EARS, NOSE, MOUTH, AND THROAT: Hearing normal. No swallowing difficulties.  No sore throat.  ENDOCRINE: No history of diabetes or thyroid disease.  LYMPHATIC: No enlarged lymph nodes  RESPIRATORY: No shortness of breath, cough, asthma or wheezing.  No hemoptysis.  CARDIOVASCULAR: No angina, orthopnea, or edema.  No HTN.  No hyperlipidemia.  GASTROINTESTINAL: No constipation, diarrhea, or melena.  No reflux.  No nausea, vomiting.  GENITOURINARY: No dysuria, hematuria, urgency, or frequency.  NEUROLOGIC: +sciatica.  No numbness, weakness, motor disturbance, syncope, seizure, or headaches.  MUSCULOSKELETAL: +back pain.  No muscle weakness.  INTEGUMENTARY: +stable angioma-appearing lesion on her right shin.  GYNECOLOGIC: +hot flashes.  Per history of present illness.  HEMATOLOGIC: No bleeding problems.  Denies easy  bruising.    Objective      Physical Exam  /78  Pulse 85  Temp 97.9 °F (36.6 °C) (Temporal Artery )   Resp 14  Wt 204 lb (92.5 kg)  SpO2 95%  BMI 32.93 kg/m2  Body mass index is 32.93 kg/(m^2).    Wt Readings from Last 3 Encounters:   10/25/17 204 lb (92.5 kg)   08/31/17 203 lb (92.1 kg)   08/09/17 199 lb (90.3 kg)     ECOG PS 0    GENERAL: Alert, well-appearing female in no apparent distress.  She appears her stated age.  She is here with a young child  HEENT: Sclera anicteric, normal eyelids. Head normocephalic, atraumatic. Mucus membranes moist.  Normal dentition.    NECK: Trachea midline.  No obvious masses.      BREASTS: Deferred (Performed at last visit).    CHEST: Prior site of PAC removal has healed  GASTROINTESTINAL:  Soft, non-tender, non-distended, no rebound or guarding, no masses, or hernias.  No HSM.  Incisions well-healed midline vertical laparotomy incision  MUSCULOSKELETAL:  Normal gait and station.  FROMx4.  No digital cyanosis.    SKIN:  Warm, dry, well-perfused.  All visible areas intact.  No rashes, lesions, ulcers.  PSYCHIATRIC: AO x3, with appropriate affect, normal thought processes  LYMPHATICS:  No cervical or inguinal adenopathy noted.  PELVIC exam:  Normal external female genitalia without lesions or skin changes.  Urethra midline.  Vagina without lesions.  Cuff intact, well-healed, and without visible lesions.  On bimanual exam vagina and cuff are smooth and without nodularity.  No palpable abdominal masses. Rectovaginal exam confirmatory. The pelvic sidewalls are smooth, the cul de sac is clear, the rectovaginal septum is supple.    Diagnostic Data:    Lab Results   Component Value Date     27.9 08/09/2017     28.8 01/18/2017     23.3 11/17/2016     23.1 08/31/2016     24.6 08/05/2016     Mammogram and breast u/s reviewed  Genetic counseling and testing results reviewed      Assessment/Plan  This is a 47 y.o. woman with stage IC1 grade 2 mucinous  adenocarcinoma of the ovary here for a surveillance visit     1.  Ovarian cancer:   -No evidence of disease.    - has been stable.  Will consider trending annually or if symptoms develop.  -We previously discussed the typical recommendation for ovarian cancer surveillance are every 3 months for the two years, every 4-6 months for for the third year, every 6 months for years 3-5, and annually thereafter.  We discussed need to monitor symptoms including any abdominal pain, bloating, changes in bowel or bladder habits.  She knows to be particularly aware of symptoms that occur several times a week for at least several weeks.       2.  Smoking cessation: I discuss this at every visit.  She continues to smoke and understands that e-cigarettes are not any safer.  We discussed various strategies and she will consider these.    3.  Genetics:    -Had genetic counseling and testing and tested positive for CHEK2 gene (c.470T>C)  -Sees genetics today to follow up on these results and have counseling regarding what the mean for surveillance going forward  -I have asked that she be seen in high-risk clinic by Jazzmine DAMON in 3 months.  -Mammogram is up to date.  Will need breast MRI and colonoscopy to be coordinated and monitored via [facility].    -I put in the colonoscopy request today.    4.  Follow-up:   -RTC in 3 months to see Jazzmine DAMON for HR[facility] visit and thereafter to also be followed for ongoing ovarian cancer surveillance  -Follow up with genetics today  -Schedule colonoscopy  -Eventually refer for survivorship.            Electronically Signed by: Glenys Roman MD  Date: 10/25/2017      Time:  11:08 AM

## 2018-01-23 ENCOUNTER — OFFICE VISIT (OUTPATIENT)
Dept: GYNECOLOGIC ONCOLOGY | Facility: CLINIC | Age: 48
End: 2018-01-23

## 2018-01-23 VITALS
OXYGEN SATURATION: 97 % | HEART RATE: 78 BPM | WEIGHT: 202 LBS | BODY MASS INDEX: 32.6 KG/M2 | RESPIRATION RATE: 18 BRPM | SYSTOLIC BLOOD PRESSURE: 137 MMHG | TEMPERATURE: 96.6 F | DIASTOLIC BLOOD PRESSURE: 83 MMHG

## 2018-01-23 DIAGNOSIS — Z12.39 BREAST CANCER SCREENING, HIGH RISK PATIENT: Primary | ICD-10-CM

## 2018-01-23 DIAGNOSIS — Z15.01 MONOALLELIC MUTATION OF CHEK2 GENE IN FEMALE PATIENT: ICD-10-CM

## 2018-01-23 DIAGNOSIS — Z15.02 MONOALLELIC MUTATION OF CHEK2 GENE IN FEMALE PATIENT: ICD-10-CM

## 2018-01-23 DIAGNOSIS — Z15.89 MONOALLELIC MUTATION OF CHEK2 GENE IN FEMALE PATIENT: ICD-10-CM

## 2018-01-23 DIAGNOSIS — Z15.09 MONOALLELIC MUTATION OF CHEK2 GENE IN FEMALE PATIENT: ICD-10-CM

## 2018-01-23 DIAGNOSIS — C56.1 MALIGNANT NEOPLASM OF RIGHT OVARY (HCC): ICD-10-CM

## 2018-01-23 PROBLEM — Z91.89 AT HIGH RISK FOR BREAST CANCER: Status: ACTIVE | Noted: 2018-01-23

## 2018-01-23 PROCEDURE — 99215 OFFICE O/P EST HI 40 MIN: CPT | Performed by: NURSE PRACTITIONER

## 2018-01-23 NOTE — PROGRESS NOTES
GYN ONCOLOGY HIGH RISK CLINIC    Gertrudis Yost  [id number]  1970    Chief Complaint:   Chief Complaint   Patient presents with   • Follow-up     high risk       HISTORY OF PRESENT ILLNESS:    Gertrudis Yost is a 47 y.o. year old female here today for her high risk visit. She is an ovarian cancer patient, followed until this time by Dr. Glenys Roamn, see cancer history below. Due to her Personal and family history she underwent genetic counseling and genetic testing.  Testing was positive for CHEK2 mutation.  This has been estimated to increase the risk of female breast cancer to somewhere between 24-48% dependent on family history.  There is also a possible increased risk for colorectal cancer in both men and women up to 9.5%.  Increased risk for breast and colon cancer was discussed with her with our genetic counselor Vicki Boo following the diagnosis.  Gertrudis is here today to establish high-risk care along with her cancer surveillance for organization and management of her screenings.    She has never taken hormone replacement therapy.  She completed mammogram with ultrasound in October 2017, results BiRADS 2.  She has a colonoscopy scheduled for next month.  Devan reports she is mostly understanding of the abdomen and recommendations for CHEK2 mutation.  Since meeting with the genetic counselor she has though about prophylactic mastectomy and reconstruction.  She would like to proceed with high-risk screenings at this time, but with like a referral for consult with breast surgeon.  She is not interested in chemoprevention at this time as she would like to first make a decision regarding surgery.  She has not yet had a high-risk screening breast MRI.     Gertrudis has been performing home self breast exams monthly since her diagnosis.  She denies any palpable masses or skin changes.  She denies any current pelvic concerns.       Ovarian cancer    4/2016 Imaging     20 cm right pelvic mass seen upon CT during  work-up for abdominal pain with leukocytosis and emesis. Referred to Gyn Oncology. CA-125 = 147 and CEA = 1.9         4/11/2016 Surgery     Laparotomy and debulking with Dr. Roman along with cholecystectomy by Dr. Leslie.   Cancer staging surgery included MORE/BSO, bilateral pelvic and periaortic lymph node dissection, omentectomy, appendectomy, and peritoneal stripping. There was intra-operative spill of the right ovarian mass during surgery. 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  Washings negative for malignant cells.  No LVSI, no ovarian surface involvement, omentum negative, and all sampled nodes negative.  Due to intraoperative rupture, final Stage ICI grade 2         5/2/2016 Surgery     Port-A-Cath placement           5/11/2016 - 8/31/2016 Chemotherapy     Carbo/Taxol every 3 weeks × 6 cycles         10/25/2016 Imaging     Posttreatment CT scan showed only a small node in the left external iliac chain not meeting criteria for malignant adenopathy, unchanged from preop scan.  Study otherwise negative.         9/7/2017 Surgery     Port-A-Cath removal           9/2017 Genetic Testing     Genetic testing was positive for CHEK2 gene (c.470T>C)              Past Medical History:   Diagnosis Date   • Abdominal pain    • Anxiety    • BMI 29.0-29.9,adult     BMI > = 18 - 29.6   • Chronic narcotic use     Chronic neck pain.  Dr. Mian Somers.   • Chronic neck pain    • Current every day smoker    • Dehydration    • Drug therapy 2016   • Emesis 8/25/2016   • GERD (gastroesophageal reflux disease)    • Leukocytosis 8/25/2016   • Ovarian cancer 04/2016    Stage 1C1 mucinous adenocarcinoma       Past Surgical History:   Procedure Laterality Date   • APPENDECTOMY  04/2016   • CHOLECYSTECTOMY OPEN  04/2016   • MANDIBLE FRACTURE SURGERY      jaw broke and reset   • TOTAL ABDOMINAL HYSTERECTOMY WITH SALPINGO OOPHORECTOMY Bilateral 04/2016    Ovarian cancer  debulking   • VENOUS ACCESS DEVICE (PORT) INSERTION Left 05/2016       Family History   Problem Relation Age of Onset   • Heart disease Mother    • Breast cancer Maternal Grandmother      age unknown   • BRCA 1/2 Neg Hx    • Colon cancer Neg Hx    • Endometrial cancer Neg Hx    • Ovarian cancer Neg Hx        Health Maintenance:    Regular self breast exam: yes  Mammogram: 10/3/2017. History of abnormal mammogram: yes - brought back for diagnostic with u/s this year  Breast MRI: NEEDS  Ovarian Cancer screen: N/A, s/p MORE/BSO for ovarian cancer (see above)  Colonoscopy: scheduled for next month    Risk Asessment: Genetic Testing: CHEK2+    Allergies: No Known Allergies    Medications: Medications have been reviewed in the chart and reconciled.     Review of Systems   Constitutional: Negative for appetite change, chills, fatigue, fever and unexpected weight change.   Respiratory: Negative for cough, shortness of breath and wheezing.    Cardiovascular: Negative for chest pain, palpitations and leg swelling.   Gastrointestinal: Negative for abdominal distention, abdominal pain, blood in stool, constipation, diarrhea, nausea and vomiting.   Endocrine: Negative.    Genitourinary: Negative for dyspareunia, dysuria, frequency, genital sores, hematuria, pelvic pain, urgency, vaginal bleeding, vaginal discharge and vaginal pain.   Musculoskeletal: Negative for arthralgias, gait problem and joint swelling.   Neurological: Negative for dizziness, seizures, syncope, weakness, light-headedness, numbness and headaches.   Hematological: Negative for adenopathy.   Psychiatric/Behavioral: Negative.        Physical Exam  Vital Signs: /83  Pulse 78  Temp 96.6 °F (35.9 °C) (Temporal Artery )   Resp 18  Wt 91.6 kg (202 lb)  SpO2 97%  BMI 32.6 kg/m2   General Appearance:  alert, cooperative, no apparent distress and appears stated age   Neurologic/Psychiatric: A&O x 3, gait steady, appropriate affect   HEENT:  Normocephalic,  without obvious abnormality, mucous membranes moist   Neck: Supple, symmetrical, trachea midline, no adenopathy;  No thyromegaly, masses, or tenderness   Back:   Symmetric, no curvature, ROM normal, no CVA tenderness   Lungs:   Clear to auscultation bilaterally; respirations regular, even, and unlabored bilaterally   Heart:  Regular rate and rhythm, no murmurs appreciated   Breasts:  Symmetrical, no masses, no lesions and no nipple discharge   Abdomen:   Soft, non-tender, non-distended, no organomegaly and Exam limited d/t habitus.   Lymph nodes: No axillary adenopathy noted   Extremities: Normal, atraumatic; no clubbing, cyanosis, or edema    Skin: No rashes, ulcers, or suspicious lesions noted   Pelvic: deferred       Assessment and Plan:    Gertrudis was seen today for follow-up.    Diagnoses and all orders for this visit:    Breast cancer screening, high risk patient  -     Ambulatory Referral to General Surgery  -     MRI Breast Bilateral Screening With & Without Contrast; Future    Monoallelic mutation of CHEK2 gene in female patient  -     Ambulatory Referral to General Surgery  -     MRI Breast Bilateral Screening With & Without Contrast; Future    Malignant neoplasm of right ovary      No breast masses or skin changes noted on today's exam. No pelvic concerns today, will resume her routine ovarian cancer surveillance with next visit, but encouraged to call with any changes or concerns between visits.     Gertrudis and I reviewed the recommended screenings for CHEK2+ as listed below.  Her mammogram is UTD, but she has not yet completed a breast MRI or colonoscopy. Colon screening is scheduled for next month. Breast MRI will be ordered today. Our office will organize and call her to schedule.   She is desiring of a consult with breast surgeon regarding risk reducing mastectomy. She would like to make a decision regarding surgery and if no surgery, may again consider chemoprevention.   She will continue to be seen for  ovarian cancer surveillance every 3 months for now. This will go to every 6 months after 2 years. We will perform clinical breast exams and follow her high risk screenings twice yearly along with her cancer surveillance. She v/u and is accepting of this plan.     Patient Education:  Reviewed screening schedule related to diagnosis as follows:  Monthly breast exams starting at age 18.  Clinical breast exam every 6 months.  Annual mammogram starting at age 40, or earlier based upon family history.  Annual breast MRI starting at age 40.  Consider chemoprevention for breast cancer risk reduction (Tamoxifen or Raloxifene).  Consider risk-reducing mastectomy.      Total Time Spent: 45 minutes    Return in about 3 months (around 4/23/2018) for ongoing cancer surveillance.      NELSON Stone        Note: Speech recognition transcription software was used to dictate portions of this document.  An attempt at proofreading has been made though minor errors in transcription may still be present.  Please do not hesitate to call our office with any questions.

## 2018-01-23 NOTE — PROGRESS NOTES
GYN ONCOLOGY FOLLOW-UP    Gertrudis Yost  [id number]  1970    Chief Complaint: Follow-up (high risk)        History of present illness:  Gertrudis Yost is a 47 y.o. year old female who is here today for {Why patient here?:43569}. ***    Oncology History:       Ovarian cancer    4/11/2016 Surgery     Surgery       Procedure: Laparotomy and debulking surgery for ovarian cancer.  Her surgery included a total abdominal hysterectomy, bilateral salpingoophorectomy, bilateral pelvic lymph node dissection, bilateral periaortic lymph node dissection, omentectomy, appendectomy, tumor debulking and peritoneal stripping to no gross residual visible disease.  She had a cholecystectomy performed by Dr. Leslie.  Intraoperatively she had surgical spill of her large right-sided ovarian mass.              4/11/2016 Cancer Staged     Ovarian cancer    Staging form: Ovary, AJCC V7    - Pathologic stage from 4/26/2016: FIGO Stage IC1, calculated as Stage IC (T1c, N0, cM0) - Signed by Glenys Roman MD on 5/26/2016  Final surgical pathology showed a 20 x 20 x 12.5 cm mucinous adenocarcinoma of the right ovary.  Her tumor was grade 2.  Given the intraoperative rupture, the stage is 1C1.  Washings collected prior to rupture were negative for malignant cells.  There was no ovarian surface involvement, adhesions sampled to the sigmoid colon were negative.  There was no LVSI.  Right pelvic lymph nodes were 0/8, left pelvic lymph nodes 0/4, right periaortic lymph nodes 0/1, and 0/2 left periaortic lymph nodes.  The omentum was negative.  The appendix was negative.          5/11/2016 - 8/31/2016 Chemotherapy     Carboplatin / paclitaxel.  Six cycles planned with six cycles completed.          10/25/2016 Imaging     CT scan abdomen / pelvis  IMPRESSION:  A large complex pelvic mass has been resected since the  previous examination of 04/04/2016. There is a trace amount of pelvic  fluid. There is a small node in the left external iliac  "chain using  standard CT criteria, the short axis measurement does not meet the  required criteria for malignant adenopathy. This node is unchanged since  a preoperative examination of 04/04/2016.         9/2017 Genetic Testing     Genetic testing was positive for CHEK2 gene (c.470T>C)            Past Medical History:   Diagnosis Date   • Abdominal pain    • Anxiety    • BMI 29.0-29.9,adult     BMI > = 18 - 29.6   • Chronic narcotic use     Chronic neck pain.  Dr. Mian Somers.   • Chronic neck pain    • Current every day smoker    • Dehydration    • Drug therapy 2016   • Emesis 8/25/2016   • GERD (gastroesophageal reflux disease)    • Leukocytosis 8/25/2016   • Ovarian cancer 04/2016    Stage 1C1 mucinous adenocarcinoma       Past Surgical History:   Procedure Laterality Date   • APPENDECTOMY  04/2016   • CHOLECYSTECTOMY OPEN  04/2016   • MANDIBLE FRACTURE SURGERY      jaw broke and reset   • TOTAL ABDOMINAL HYSTERECTOMY WITH SALPINGO OOPHORECTOMY Bilateral 04/2016    Ovarian cancer debulking   • VENOUS ACCESS DEVICE (PORT) INSERTION Left 05/2016       MEDICATIONS: The current medication list was reviewed and reconciled.     Allergies:  has No Known Allergies.    Family History   Problem Relation Age of Onset   • Heart disease Mother    • Breast cancer Maternal Grandmother      age unknown   • BRCA 1/2 Neg Hx    • Colon cancer Neg Hx    • Endometrial cancer Neg Hx    • Ovarian cancer Neg Hx          Last imaging study was 10/26/2016   Tumor marker:   Lab Results   Component Value Date     27.9 08/09/2017       Review of Systems    Physical Exam  Vital Signs: /83  Pulse 78  Temp 96.6 °F (35.9 °C) (Temporal Artery )   Resp 18  Wt 91.6 kg (202 lb)  SpO2 97%  BMI 32.6 kg/m2   General Appearance:  {GynOnc General Appearance:62804::\"alert, cooperative, no apparent distress\",\"appears stated age\"}   Neurologic/Psychiatric: A&O x 3, gait steady, appropriate affect   HEENT:  Normocephalic, without obvious " "abnormality, mucous membranes moist   Neck: Supple, symmetrical, trachea midline, no adenopathy;  No thyromegaly, masses, or tenderness   Back:   Symmetric, no curvature, ROM normal, no CVA tenderness   Lungs:   Clear to auscultation bilaterally; respirations regular, even, and unlabored bilaterally   Heart:  Regular rate and rhythm, no murmurs appreciated   Breasts:  {Gyn Onc Breast Exam:49279::\"Symmetrical\",\"no masses\",\"no lesions\",\"no nipple discharge\"}   Abdomen:   {GynOnc Abdomen Exam:05940::\"Soft\",\"non-tender\",\"non-distended\",\"no organomegaly\"}   Lymph nodes: No cervical, supraclavicular, inguinal or axillary adenopathy noted   Extremities: Normal, atraumatic; no clubbing, cyanosis, or edema    Pelvic: {GynOnc Pelvic Exam:57247}     ECOG Performance Status: {performance status:04071}    Procedure Notes:  No notes on file    Assessment and Plan:    There are no diagnoses linked to this encounter.    ***    No Follow-up on file.          Note: Speech recognition transcription software was used to dictate portions of this document.  An attempt at proofreading has been made though minor errors in transcription may still be present.  Please do not hesitate to call our office with any questions.  "

## 2018-01-25 ENCOUNTER — DOCUMENTATION (OUTPATIENT)
Dept: GYNECOLOGIC ONCOLOGY | Facility: CLINIC | Age: 48
End: 2018-01-25

## 2018-01-25 NOTE — PROGRESS NOTES
Peer to peer review on Breast MRI initially declined by insurance.   Spoke with Dr. Sharona Chaudhry, additional clinical information given.   Authorization # [id number]

## 2018-01-30 ENCOUNTER — HOSPITAL ENCOUNTER (OUTPATIENT)
Dept: MRI IMAGING | Facility: HOSPITAL | Age: 48
Discharge: HOME | End: 2018-01-30
Admitting: NURSE PRACTITIONER

## 2018-01-30 DIAGNOSIS — Z12.39 BREAST CANCER SCREENING, HIGH RISK PATIENT: ICD-10-CM

## 2018-01-30 DIAGNOSIS — Z15.89 MONOALLELIC MUTATION OF CHEK2 GENE IN FEMALE PATIENT: ICD-10-CM

## 2018-01-30 DIAGNOSIS — Z15.02 MONOALLELIC MUTATION OF CHEK2 GENE IN FEMALE PATIENT: ICD-10-CM

## 2018-01-30 DIAGNOSIS — Z15.01 MONOALLELIC MUTATION OF CHEK2 GENE IN FEMALE PATIENT: ICD-10-CM

## 2018-01-30 DIAGNOSIS — Z15.09 MONOALLELIC MUTATION OF CHEK2 GENE IN FEMALE PATIENT: ICD-10-CM

## 2018-01-30 PROCEDURE — A9577 INJ MULTIHANCE: HCPCS | Performed by: NURSE PRACTITIONER

## 2018-01-30 PROCEDURE — C8908 MRI W/O FOL W/CONT, BREAST,: HCPCS

## 2018-01-30 PROCEDURE — 0159T PR BREAST MRI, COMPUTER AIDED DETECTION: CPT | Performed by: RADIOLOGY

## 2018-01-30 PROCEDURE — 77059 MRI BREAST BILATERAL SCREENING W WO CONTRAST: CPT | Performed by: RADIOLOGY

## 2018-01-30 PROCEDURE — 0 GADOBENATE DIMEGLUMINE 529 MG/ML SOLUTION: Performed by: NURSE PRACTITIONER

## 2018-01-30 RX ADMIN — GADOBENATE DIMEGLUMINE 19 ML: 529 INJECTION, SOLUTION INTRAVENOUS at 13:23

## 2018-01-31 ENCOUNTER — TELEPHONE (OUTPATIENT)
Dept: MRI IMAGING | Facility: HOSPITAL | Age: 48
End: 2018-01-31

## 2018-01-31 NOTE — TELEPHONE ENCOUNTER
Called pt with MRI Breast results. Recommended yearly high risk screening. Pt understood and will call with any further questions or concerns.

## 2018-02-08 ENCOUNTER — TELEPHONE (OUTPATIENT)
Dept: GYNECOLOGIC ONCOLOGY | Facility: CLINIC | Age: 48
End: 2018-02-08

## 2018-02-08 NOTE — TELEPHONE ENCOUNTER
----- Message from NELSON Stone sent at 2/8/2018  9:50 AM EST -----  Regarding: RE: colonoscopy  Hermila, can you call patient to discuss?    Colonoscopy is the gold standard screening and I would encourage her to do her best to complete the regular prep and have the study, especially since she is high risk.   If unable to complete/afford preps, she should follow up with GI or PCP to see if they offer Cologard. If Cologard is concerning, however, next step is colonoscopy.     Thanks!    ----- Message -----     From: Ana Boggs     Sent: 2/7/2018   4:11 PM       To: NELSON Stone  Subject: colonoscopy                                      Patient phoned and states she just can't afford the price of the prep for colonoscopy. They offered the gallon jug first and she couldn't do that. Then called in the smaller bottles but she can't afford because its $130 something              I called patient. She states Dr. Hernandez office would not let her do any other prep than the 130$ prep they prescribed. She wanted to know if it was ok for her pcp to refer her to different doctor in [facility] that her sister used that allowed her to do otc prep that was much cheaper. She was instructed to have them send us records and if she can't get the colonoscopy at least get the cologaurd. She v/u.

## 2018-04-24 ENCOUNTER — OFFICE VISIT (OUTPATIENT)
Dept: GYNECOLOGIC ONCOLOGY | Facility: CLINIC | Age: 48
End: 2018-04-24

## 2018-04-24 ENCOUNTER — APPOINTMENT (OUTPATIENT)
Dept: LAB | Facility: HOSPITAL | Age: 48
End: 2018-04-24

## 2018-04-24 VITALS
DIASTOLIC BLOOD PRESSURE: 92 MMHG | OXYGEN SATURATION: 96 % | HEART RATE: 65 BPM | SYSTOLIC BLOOD PRESSURE: 139 MMHG | BODY MASS INDEX: 33.25 KG/M2 | TEMPERATURE: 98.3 F | WEIGHT: 206 LBS | RESPIRATION RATE: 16 BRPM

## 2018-04-24 DIAGNOSIS — Z15.09 MONOALLELIC MUTATION OF CHEK2 GENE IN FEMALE PATIENT: ICD-10-CM

## 2018-04-24 DIAGNOSIS — Z15.01 MONOALLELIC MUTATION OF CHEK2 GENE IN FEMALE PATIENT: ICD-10-CM

## 2018-04-24 DIAGNOSIS — Z15.02 MONOALLELIC MUTATION OF CHEK2 GENE IN FEMALE PATIENT: ICD-10-CM

## 2018-04-24 DIAGNOSIS — R11.0 NAUSEA: ICD-10-CM

## 2018-04-24 DIAGNOSIS — Z15.89 MONOALLELIC MUTATION OF CHEK2 GENE IN FEMALE PATIENT: ICD-10-CM

## 2018-04-24 DIAGNOSIS — C56.1 MALIGNANT NEOPLASM OF RIGHT OVARY (HCC): Primary | ICD-10-CM

## 2018-04-24 LAB — CANCER AG125 SERPL QL: 6.6 U/ML (ref 0–30.2)

## 2018-04-24 PROCEDURE — 36415 COLL VENOUS BLD VENIPUNCTURE: CPT | Performed by: NURSE PRACTITIONER

## 2018-04-24 PROCEDURE — 86304 IMMUNOASSAY TUMOR CA 125: CPT | Performed by: NURSE PRACTITIONER

## 2018-04-24 PROCEDURE — 99214 OFFICE O/P EST MOD 30 MIN: CPT | Performed by: NURSE PRACTITIONER

## 2018-04-24 RX ORDER — OMEPRAZOLE 40 MG/1
40 CAPSULE, DELAYED RELEASE ORAL DAILY
Qty: 30 CAPSULE | Refills: 0 | Status: SHIPPED | OUTPATIENT
Start: 2018-04-24 | End: 2019-01-30

## 2018-04-24 NOTE — PROGRESS NOTES
GYN ONCOLOGY CANCER SURVEILLANCE FOLLOW-UP    Gertrudis Yost  [id number]  1970    Chief Complaint: Follow-up (nausea x's 1 month.)        History of present illness:  Gertrudis Yost is a 47 y.o. year old female who is here today for ongoing surveillance of Ovarian Cancer, see Cancer History. She is also followed for high risk surveillance due to CHEK2 mutation. She completed her initial high risk breast MRI in 1/2018 and mammogram will be due to be repeated in 10/2018. She denies any breast changes or concerns, will be due for CBE with next visit in 3 months.   Gertrudis c/o daily morning nausea x 1 month. She reports bowels and bladder are working normally. Associated symptoms include heartburn. She has taken reflux medications in the past but is not using anything at this time. She has not tried to correlate her nausea with any particular foods. Symptoms are typically worse before the first meal of the day. She denies abdominal or pelvic pain. Denies bloating or fullness. Her CA-125 has been stable in 20's since completion of treatment. She is due to repeat tumor marker now.      Cancer History:      Ovarian cancer    4/2016 Imaging     20 cm right pelvic mass seen upon CT during work-up for abdominal pain with leukocytosis and emesis. Referred to Gyn Oncology. CA-125 = 147 and CEA = 1.9         4/11/2016 Surgery     Laparotomy and debulking with Dr. Roman along with cholecystectomy by Dr. Leslie.   Cancer staging surgery included OMRE/BSO, bilateral pelvic and periaortic lymph node dissection, omentectomy, appendectomy, and peritoneal stripping. There was intra-operative spill of the right ovarian mass during surgery. 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  Washings negative for malignant cells.  No LVSI, no ovarian surface involvement, omentum negative, and all sampled nodes negative.  Due to intraoperative rupture, final Stage ICI grade 2          5/2/2016 Surgery     Port-A-Cath placement           5/11/2016 - 8/31/2016 Chemotherapy     Carbo/Taxol every 3 weeks × 6 cycles         10/25/2016 Imaging     Posttreatment CT scan showed only a small node in the left external iliac chain not meeting criteria for malignant adenopathy, unchanged from preop scan.  Study otherwise negative.         9/7/2017 Surgery     Port-A-Cath removal           9/2017 Genetic Testing     Genetic testing was positive for CHEK2 gene (c.470T>C)            Past Medical History:   Diagnosis Date   • Abdominal pain    • Anxiety    • BMI 29.0-29.9,adult     BMI > = 18 - 29.6   • Chronic narcotic use     Chronic neck pain.  Dr. Mian Somers.   • Chronic neck pain    • Current every day smoker    • Dehydration    • Drug therapy 2016   • Emesis 8/25/2016   • GERD (gastroesophageal reflux disease)    • Leukocytosis 8/25/2016   • Ovarian cancer 04/2016    Stage 1C1 mucinous adenocarcinoma       Past Surgical History:   Procedure Laterality Date   • APPENDECTOMY  04/2016   • CHOLECYSTECTOMY OPEN  04/2016   • MANDIBLE FRACTURE SURGERY      jaw broke and reset   • TOTAL ABDOMINAL HYSTERECTOMY WITH SALPINGO OOPHORECTOMY Bilateral 04/2016    Ovarian cancer debulking   • VENOUS ACCESS DEVICE (PORT) INSERTION Left 05/2016       MEDICATIONS: The current medication list was reviewed and reconciled.     Allergies:  has No Known Allergies.    Family History   Problem Relation Age of Onset   • Heart disease Mother    • Breast cancer Maternal Grandmother      age unknown   • BRCA 1/2 Neg Hx    • Colon cancer Neg Hx    • Endometrial cancer Neg Hx    • Ovarian cancer Neg Hx        Last imaging study was CT 10/25/2016.   Tumor marker:  Component      Latest Ref Rng & Units 8/31/2016 11/17/2016 1/18/2017 8/9/2017         0.0 - 30.2 U/mL 23.1 23.3 28.8 27.9       Review of Systems   Constitutional: Negative for appetite change, chills, fatigue, fever and unexpected weight change.   Respiratory: Negative  for cough, shortness of breath and wheezing.    Cardiovascular: Negative for chest pain, palpitations and leg swelling.   Gastrointestinal: Positive for nausea (AM x 1 month). Negative for abdominal distention, abdominal pain, blood in stool, constipation, diarrhea and vomiting.   Endocrine: Negative.    Genitourinary: Negative for dyspareunia, dysuria, frequency, genital sores, hematuria, pelvic pain, urgency, vaginal bleeding, vaginal discharge and vaginal pain.   Musculoskeletal: Negative for arthralgias, gait problem and joint swelling.   Neurological: Negative for dizziness, seizures, syncope, weakness, light-headedness, numbness and headaches.   Hematological: Negative for adenopathy.   Psychiatric/Behavioral: Negative.        Physical Exam  Vital Signs: /92   Pulse 65   Temp 98.3 °F (36.8 °C) (Temporal Artery )   Resp 16   Wt 93.4 kg (206 lb)   SpO2 96%   BMI 33.25 kg/m²    General Appearance:  alert, cooperative, no apparent distress, appears stated age and obese   Neurologic/Psychiatric: A&O x 3, gait steady, appropriate affect   HEENT:  Normocephalic, without obvious abnormality, mucous membranes moist   Neck: Supple, symmetrical, trachea midline, no adenopathy;  No thyromegaly, masses, or tenderness   Back:   Symmetric, no curvature, ROM normal, no CVA tenderness   Lungs:   Clear to auscultation bilaterally; respirations regular, even, and unlabored bilaterally   Heart:  Regular rate and rhythm, no murmurs appreciated   Breasts:  deferred   Abdomen:   Soft, non-tender, non-distended, no organomegaly and Exam limited d/t habitus.   Lymph nodes: No cervical, supraclavicular, inguinal or axillary adenopathy noted   Extremities: Normal, atraumatic; no clubbing, cyanosis, or edema    Pelvic: External Genitalia  without lesions or skin changes  Vagina  is pink, moist, without lesions.   Vaginal Cuff  Female Vaginal Cuff: smooth, intact and without visible lesions  Uterus  surgically absent  Ovaries   surgically absent bilaterallly and without palpable masses or fullness  Parametria  smooth  Rectovaginal  Female rectovaginal: confirms no masses or bleeding and Hemoccult negative     ECOG Performance Status: 0 - Asymptomatic    Procedure Note:  No notes on file      Assessment and Plan:  Gertrudis was seen today for follow-up.    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 Take before breakfast.        There is no evidence of disease upon today's exam. CA-125 ordered today. She will be notified of results upon their return. If elevated or concerning symptoms, CT scan may be considered. She is understanding to call with any changes in pelvic symptoms or general GYN concerns at any time between regularly scheduled visits.     Gertrudis and I discussed her c/o AM nausea with reflux x2 months. She is not on any reflux medications at this time, but she has used them in the past with good symptom relief. Prilosec daily sent to pharmacy. We reviewed medication instructions, risks, benefits, and potential side effects. If symptoms persist or worsen despite management, she is encouraged to call. We may consider CT or GI referral for EGD if symptoms do not improve. She v/u.     Pt is currently UTD on high risk breast screenings. She has not completed colon cancer screening as recommended. This will be readdressed with her high risk evaluation at her next 3 month surveillance visit.     Return in about 3 months (around 7/24/2018) for ongoing cancer surveillance, High Risk visit (6 month breast exam).      NELSON Stone        Note: Speech recognition transcription software was used to dictate portions of this document.  An attempt at proofreading has been made though minor errors in transcription may still be  present.  Please do not hesitate to call our office with any questions.

## 2018-04-25 ENCOUNTER — TELEPHONE (OUTPATIENT)
Dept: GYNECOLOGIC ONCOLOGY | Facility: CLINIC | Age: 48
End: 2018-04-25

## 2018-04-25 NOTE — TELEPHONE ENCOUNTER
----- Message from NELSON Stone sent at 4/24/2018  1:28 PM EDT -----  Please notify CA-125 low! Thanks!      Called and left message that blood work looks good. Call me back when message received.

## 2018-07-24 ENCOUNTER — OFFICE VISIT (OUTPATIENT)
Dept: GYNECOLOGIC ONCOLOGY | Facility: CLINIC | Age: 48
End: 2018-07-24

## 2018-07-24 ENCOUNTER — APPOINTMENT (OUTPATIENT)
Dept: LAB | Facility: HOSPITAL | Age: 48
End: 2018-07-24

## 2018-07-24 VITALS
TEMPERATURE: 97.6 F | WEIGHT: 198 LBS | RESPIRATION RATE: 16 BRPM | DIASTOLIC BLOOD PRESSURE: 79 MMHG | OXYGEN SATURATION: 97 % | HEART RATE: 83 BPM | SYSTOLIC BLOOD PRESSURE: 132 MMHG | BODY MASS INDEX: 31.96 KG/M2

## 2018-07-24 DIAGNOSIS — Z15.02 MONOALLELIC MUTATION OF CHEK2 GENE IN FEMALE PATIENT: ICD-10-CM

## 2018-07-24 DIAGNOSIS — Z15.01 MONOALLELIC MUTATION OF CHEK2 GENE IN FEMALE PATIENT: ICD-10-CM

## 2018-07-24 DIAGNOSIS — Z15.09 MONOALLELIC MUTATION OF CHEK2 GENE IN FEMALE PATIENT: ICD-10-CM

## 2018-07-24 DIAGNOSIS — C56.1 MALIGNANT NEOPLASM OF RIGHT OVARY (HCC): Primary | ICD-10-CM

## 2018-07-24 DIAGNOSIS — Z15.89 MONOALLELIC MUTATION OF CHEK2 GENE IN FEMALE PATIENT: ICD-10-CM

## 2018-07-24 DIAGNOSIS — Z91.89 AT HIGH RISK FOR BREAST CANCER: ICD-10-CM

## 2018-07-24 LAB — CANCER AG125 SERPL QL: 7.7 U/ML (ref 0–30.2)

## 2018-07-24 PROCEDURE — 86304 IMMUNOASSAY TUMOR CA 125: CPT | Performed by: NURSE PRACTITIONER

## 2018-07-24 PROCEDURE — 36415 COLL VENOUS BLD VENIPUNCTURE: CPT | Performed by: NURSE PRACTITIONER

## 2018-07-24 PROCEDURE — 99214 OFFICE O/P EST MOD 30 MIN: CPT | Performed by: NURSE PRACTITIONER

## 2018-07-24 NOTE — PROGRESS NOTES
GYN ONCOLOGY CANCER SURVEILLANCE FOLLOW-UP     Gertrudis Yost  [id number]  1970    Chief Complaint: Follow-up (no complaints)        History of present illness:  Gertrudis Yost is a 48 y.o. year old female who is here today for ongoing surveillance of Ovarian Cancer, see Cancer History, and high risk visit. She is now approaching 2 years since completion of treatment. CA-125 was stable in 20's for come time, then last level was 6.6. She is due to repeat this today. She reports she is feeling very well today and has no complaints. She denies vaginal bleeding, pelvic pain, and changes in bowel or bladder function.  Gertrudis has tested positive for CHEK2 mutation and is followed for high risk status as well.This has been estimated to increase the risk of female breast cancer to somewhere between 24-48% dependent on family history.  There is also a possible increased risk for colorectal cancer in both men and women up to 9.5%. She completed her initial high risk breast MRI in 1/2018 and mammogram will be due to be repeated in 10/2018. She previously considered prophylactic mastectomy, but has decided to decline this currently and proceed with her imaging surveillance. She denies any breast changes or concerns, due for clinical breast exam today. She has not completed a colonoscopy due to cost of the prep.     Cancer History:      Ovarian cancer (CMS/HCC)    4/2016 Imaging     20 cm right pelvic mass seen upon CT during work-up for abdominal pain with leukocytosis and emesis. Referred to Gyn Oncology. CA-125 = 147 and CEA = 1.9         4/11/2016 Surgery     Laparotomy and debulking with Dr. Roman along with cholecystectomy by Dr. Leslie.   Cancer staging surgery included MORE/BSO, bilateral pelvic and periaortic lymph node dissection, omentectomy, appendectomy, and peritoneal stripping. There was intra-operative spill of the right ovarian mass during surgery. R = 0    Surgical pathology revealed 20 x 20 x 12.5 cm  mucinous adenocarcinoma of the right ovary, tube.  Washings negative for malignant cells.  No LVSI, no ovarian surface involvement, omentum negative, and all sampled nodes negative.  Due to intraoperative rupture, final Stage ICI grade 2         5/2/2016 Surgery     Port-A-Cath placement           5/11/2016 - 8/31/2016 Chemotherapy     Carbo/Taxol every 3 weeks × 6 cycles         10/25/2016 Imaging     Posttreatment CT scan showed only a small node in the left external iliac chain not meeting criteria for malignant adenopathy, unchanged from preop scan.  Study otherwise negative.         9/7/2017 Surgery     Port-A-Cath removal           9/2017 Genetic Testing     Genetic testing was positive for CHEK2 gene (c.470T>C)            Past Medical History:   Diagnosis Date   • Abdominal pain    • Anxiety    • BMI 29.0-29.9,adult     BMI > = 18 - 29.6   • Chronic narcotic use     Chronic neck pain.  Dr. Mian Somers.   • Chronic neck pain    • Current every day smoker    • Dehydration    • Drug therapy 2016   • Emesis 8/25/2016   • GERD (gastroesophageal reflux disease)    • Leukocytosis 8/25/2016   • Ovarian cancer (CMS/HCC) 04/2016    Stage 1C1 mucinous adenocarcinoma       Past Surgical History:   Procedure Laterality Date   • APPENDECTOMY  04/2016   • CHOLECYSTECTOMY OPEN  04/2016   • MANDIBLE FRACTURE SURGERY      jaw broke and reset   • TOTAL ABDOMINAL HYSTERECTOMY WITH SALPINGO OOPHORECTOMY Bilateral 04/2016    Ovarian cancer debulking   • VENOUS ACCESS DEVICE (PORT) INSERTION Left 05/2016       MEDICATIONS: The current medication list was reviewed and reconciled.     Allergies:  has No Known Allergies.    Family History   Problem Relation Age of Onset   • Heart disease Mother    • Breast cancer Maternal Grandmother         age unknown   • BRCA 1/2 Neg Hx    • Colon cancer Neg Hx    • Endometrial cancer Neg Hx    • Ovarian cancer Neg Hx        Last imaging study was post-treatment CT 10/25/2016.   Tumor marker:  CA  125   Date Value Ref Range Status   04/24/2018 6.6 0.0 - 30.2 U/mL Final   08/09/2017 27.9 0.0 - 30.2 U/mL Final   01/18/2017 28.8 0.0 - 30.2 U/mL Final   11/17/2016 23.3 0.0 - 30.2 U/mL Final       Review of Systems   Constitutional: Negative for appetite change, chills, fatigue, fever and unexpected weight change.   Respiratory: Negative for cough, shortness of breath and wheezing.    Cardiovascular: Negative for chest pain, palpitations and leg swelling.   Gastrointestinal: Negative for abdominal distention, abdominal pain, blood in stool, constipation, diarrhea, nausea and vomiting.   Endocrine: Negative.    Genitourinary: Negative for dyspareunia, dysuria, frequency, genital sores, hematuria, pelvic pain, urgency, vaginal bleeding, vaginal discharge and vaginal pain.   Musculoskeletal: Negative for arthralgias, gait problem and joint swelling.   Neurological: Negative for dizziness, seizures, syncope, weakness, light-headedness, numbness and headaches.   Hematological: Negative for adenopathy.   Psychiatric/Behavioral: Negative.        Physical Exam  Vital Signs: /79   Pulse 83   Temp 97.6 °F (36.4 °C) (Temporal Artery )   Resp 16   Wt 89.8 kg (198 lb)   SpO2 97%   BMI 31.96 kg/m²    General Appearance:  alert, cooperative, no apparent distress and appears stated age   Neurologic/Psychiatric: A&O x 3, gait steady, appropriate affect   HEENT:  Normocephalic, without obvious abnormality, mucous membranes moist   Neck: Supple, symmetrical, trachea midline, no adenopathy;  No thyromegaly, masses, or tenderness   Back:   Symmetric, no curvature, ROM normal, no CVA tenderness   Lungs:   Clear to auscultation bilaterally; respirations regular, even, and unlabored bilaterally   Heart:  Regular rate and rhythm, no murmurs appreciated   Breasts:  Symmetrical, no masses, no lesions and no nipple discharge   Abdomen:   Soft, non-tender, non-distended and no organomegaly   Lymph nodes: No cervical,  supraclavicular, inguinal or axillary adenopathy noted   Extremities: Normal, atraumatic; no clubbing, cyanosis, or edema    Pelvic: External Genitalia  without lesions or skin changes  Vagina  is pink, moist, without lesions.   Vaginal Cuff  Female Vaginal Cuff: smooth, intact and without visible lesions  Uterus  surgically absent  Ovaries  surgically absent bilaterallly and without palpable masses or fullness  Parametria  smooth  Rectovaginal  Female rectovaginal: confirms no masses or bleeding and Hemoccult negative     ECOG Performance Status: 0 - Asymptomatic    Procedure Note:  No notes on file      Assessment and Plan:  Gertrudis was seen today for follow-up.    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 She will be notified of CA-125 results upon their return. She is now approaching 2 years from completion of treatment and doing very well. She may now go to every 6 month surveillance visits. Her clinical high risk breast exams can be performed at the same time. She is understanding to call with any changes in pelvic symptoms or general GYN concerns at any time between regularly scheduled visits.       Reviewed screening schedule for CHEK2+ per [facility] guidelines as follows:  Monthly breast exams starting at age 18.--reviewed proper technique and changes to report  Clinical breast exam every 6 months.--WNL today, repeat in 6 months.   Annual mammogram starting at age 40, or earlier based upon family history.--UTD, due in 10/2018  Annual breast MRI starting at age 40.--UTD, due in 1/2019  Consider chemoprevention for breast cancer risk reduction (Tamoxifen or Raloxifene).--declines  Consider risk-reducing mastectomy.--declines      Return to clinic in 6 months for ongoing cancer surveillance and High Risk  visit.      NELSON Stone        Note: Speech recognition transcription software was used to dictate portions of this document.  An attempt at proofreading has been made though minor errors in transcription may still be present.  Please do not hesitate to call our office with any questions.

## 2018-07-25 ENCOUNTER — TELEPHONE (OUTPATIENT)
Dept: GYNECOLOGIC ONCOLOGY | Facility: CLINIC | Age: 48
End: 2018-07-25

## 2018-07-25 NOTE — TELEPHONE ENCOUNTER
----- Message from NELSON Stone sent at 7/24/2018  2:17 PM EDT -----  Please notify CA-125 low! Thanks!      Called and notified patient of results. She v/u.

## 2018-07-25 NOTE — TELEPHONE ENCOUNTER
----- Message from NELSON Stone sent at 7/24/2018  2:17 PM EDT -----  Please notify CA-125 low! Thanks!      Attempted to call patient.

## 2019-01-30 ENCOUNTER — TELEPHONE (OUTPATIENT)
Dept: GYNECOLOGIC ONCOLOGY | Facility: CLINIC | Age: 49
End: 2019-01-30

## 2019-01-30 ENCOUNTER — APPOINTMENT (OUTPATIENT)
Dept: LAB | Facility: HOSPITAL | Age: 49
End: 2019-01-30

## 2019-01-30 ENCOUNTER — OFFICE VISIT (OUTPATIENT)
Dept: GYNECOLOGIC ONCOLOGY | Facility: CLINIC | Age: 49
End: 2019-01-30

## 2019-01-30 VITALS
OXYGEN SATURATION: 96 % | DIASTOLIC BLOOD PRESSURE: 92 MMHG | TEMPERATURE: 96 F | HEART RATE: 80 BPM | WEIGHT: 198 LBS | BODY MASS INDEX: 31.96 KG/M2 | SYSTOLIC BLOOD PRESSURE: 157 MMHG | RESPIRATION RATE: 16 BRPM

## 2019-01-30 DIAGNOSIS — Z15.02 MONOALLELIC MUTATION OF CHEK2 GENE IN FEMALE PATIENT: ICD-10-CM

## 2019-01-30 DIAGNOSIS — Z15.09 MONOALLELIC MUTATION OF CHEK2 GENE IN FEMALE PATIENT: ICD-10-CM

## 2019-01-30 DIAGNOSIS — Z15.01 MONOALLELIC MUTATION OF CHEK2 GENE IN FEMALE PATIENT: ICD-10-CM

## 2019-01-30 DIAGNOSIS — Z91.89 AT HIGH RISK FOR BREAST CANCER: ICD-10-CM

## 2019-01-30 DIAGNOSIS — C56.1 MALIGNANT NEOPLASM OF RIGHT OVARY (HCC): Primary | ICD-10-CM

## 2019-01-30 DIAGNOSIS — Z15.89 MONOALLELIC MUTATION OF CHEK2 GENE IN FEMALE PATIENT: ICD-10-CM

## 2019-01-30 LAB — CANCER AG125 SERPL QL: 10 U/ML (ref 0–30.2)

## 2019-01-30 PROCEDURE — 36415 COLL VENOUS BLD VENIPUNCTURE: CPT | Performed by: NURSE PRACTITIONER

## 2019-01-30 PROCEDURE — 86304 IMMUNOASSAY TUMOR CA 125: CPT | Performed by: NURSE PRACTITIONER

## 2019-01-30 PROCEDURE — 99215 OFFICE O/P EST HI 40 MIN: CPT | Performed by: NURSE PRACTITIONER

## 2019-01-30 RX ORDER — METHOCARBAMOL 500 MG/1
TABLET, FILM COATED ORAL AS NEEDED
COMMUNITY
Start: 2019-01-28 | End: 2021-05-14

## 2019-01-30 NOTE — PROGRESS NOTES
GYN ONCOLOGY CANCER SURVEILLANCE FOLLOW-UP    Gertrudis Yost  [id number]  1970    Chief Complaint: Follow-up (high risk, low back and leg cramping)        History of present illness:  Gertrudis Yost is a 48 y.o. year old female who is here today for ongoing surveillance of Ovarian Cancer, see Cancer History, and high risk visit. She is now 2.5 years out from completion of treatment. CA-125 was stable in 20's for some time, then has been less than 10 for the last year. She is due to repeat this today. She denies vaginal bleeding, pelvic pain, abdominal swelling/fullness, and changes in bowel or bladder function.    Gertrudis has tested positive for CHEK2 mutation and is followed for high risk status as well. This has been estimated to increase the risk of female breast cancer to somewhere between 24-48% depending on family history.  There is also a possible increased risk for colorectal cancer in both men and women up to 9.5%. She states she attempted to schedule her annual mammogram in 10/2018, but left a voicemail with scheduling and never received a call back. She has been recommended to return to annual screening mammograms and this can be ordered today. Her bilateral breast MRI will be needed approx 6 months after mammogram.  She has not yet completed colonoscopy due to high out-of-pocket cost for prep. Upon arrival she complains of low back pain with shooting right leg pain. PCP diagnosed her with right sciatica, prescribed muscle relaxer PRN, and has order physical therapy. She is hoping to start PT next week. She denies any abdominal or pelvic pain.        Cancer History:      Ovarian cancer (CMS/HCC)    4/2016 Imaging     20 cm right pelvic mass seen upon CT during work-up for abdominal pain with leukocytosis and emesis. Referred to Gyn Oncology. CA-125 = 147 and CEA = 1.9         4/11/2016 Surgery     Laparotomy and debulking with Dr. Roman along with cholecystectomy by Dr. Leslie.   Cancer staging surgery  included MORE/BSO, bilateral pelvic and periaortic lymph node dissection, omentectomy, appendectomy, and peritoneal stripping. There was intra-operative spill of the right ovarian mass during surgery. 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  Washings negative for malignant cells.  No LVSI, no ovarian surface involvement, omentum negative, and all sampled nodes negative.  Due to intraoperative rupture, final Stage ICI grade 2         5/2/2016 Surgery     Port-A-Cath placement           5/11/2016 - 8/31/2016 Chemotherapy     Carbo/Taxol every 3 weeks × 6 cycles         10/25/2016 Imaging     Posttreatment CT scan showed only a small node in the left external iliac chain not meeting criteria for malignant adenopathy, unchanged from preop scan.  Study otherwise negative.         9/7/2017 Surgery     Port-A-Cath removal           9/2017 Genetic Testing     Genetic testing was positive for CHEK2 gene (c.470T>C)            Past Medical History:   Diagnosis Date   • Abdominal pain    • Anxiety    • BMI 29.0-29.9,adult     BMI > = 18 - 29.6   • Chronic narcotic use     Chronic neck pain.  Dr. Mian Somers.   • Chronic neck pain    • Current every day smoker    • Dehydration    • Drug therapy 2016   • Emesis 8/25/2016   • GERD (gastroesophageal reflux disease)    • Leukocytosis 8/25/2016   • Ovarian cancer (CMS/HCC) 04/2016    Stage 1C1 mucinous adenocarcinoma       Past Surgical History:   Procedure Laterality Date   • APPENDECTOMY  04/2016   • CHOLECYSTECTOMY OPEN  04/2016   • MANDIBLE FRACTURE SURGERY      jaw broke and reset   • TOTAL ABDOMINAL HYSTERECTOMY WITH SALPINGO OOPHORECTOMY Bilateral 04/2016    Ovarian cancer debulking   • VENOUS ACCESS DEVICE (PORT) INSERTION Left 05/2016       MEDICATIONS: The current medication list was reviewed and reconciled.     Allergies:  has No Known Allergies.    Family History   Problem Relation Age of Onset   • Heart  disease Mother    • Breast cancer Maternal Grandmother         age unknown   • BRCA 1/2 Neg Hx    • Colon cancer Neg Hx    • Endometrial cancer Neg Hx    • Ovarian cancer Neg Hx      Health Maintenance:    Regular self breast exam: no  Mammogram: 10/03/2017. History of abnormal mammogram: yes - follow-up negative  Breast MRI: 1/30/2018. Results: negative    Risk Asessment: Genetic Testing: CHEK2+    Last imaging study was post-treatment CT 10/25/2016.   Tumor marker:     Date Value Ref Range Status   07/24/2018 7.7 0.0 - 30.2 U/mL Final   04/24/2018 6.6 0.0 - 30.2 U/mL Final   08/09/2017 27.9 0.0 - 30.2 U/mL Final   01/18/2017 28.8 0.0 - 30.2 U/mL Final       Review of Systems   Constitutional: Negative for fatigue, fever and unexpected weight change.   HENT: Negative for congestion, ear pain, hearing loss, sinus pressure and trouble swallowing.    Eyes: Negative for visual disturbance.   Respiratory: Negative for cough, chest tightness, shortness of breath and wheezing.    Cardiovascular: Negative for chest pain, palpitations and leg swelling.   Gastrointestinal: Negative for abdominal distention, abdominal pain, constipation, diarrhea, nausea and vomiting.   Endocrine: Negative for cold intolerance, heat intolerance, polydipsia, polyphagia and polyuria.   Genitourinary: Negative for difficulty urinating, dyspareunia, dysuria, frequency, hematuria, pelvic pain, urgency, vaginal bleeding, vaginal discharge and vaginal pain.   Musculoskeletal: Positive for arthralgias and back pain (right sciatica). Negative for gait problem, joint swelling and myalgias.   Skin: Negative for color change, pallor and rash.   Neurological: Negative for dizziness, seizures, syncope, weakness, light-headedness, numbness and headaches.   Hematological: Negative for adenopathy. Does not bruise/bleed easily.   Psychiatric/Behavioral: Negative for agitation, confusion, sleep disturbance and suicidal ideas. The patient is not  nervous/anxious.        Physical Exam  Vital Signs: /92   Pulse 80   Temp 96 °F (35.6 °C) (Temporal)   Resp 16   Wt 89.8 kg (198 lb)   SpO2 96%   BMI 31.96 kg/m²    General Appearance:  alert, cooperative, no apparent distress and appears stated age   Neurologic/Psychiatric: A&O x 3, gait steady, appropriate affect   HEENT:  Normocephalic, without obvious abnormality, mucous membranes moist   Neck: Supple, symmetrical, trachea midline, no adenopathy;  No thyromegaly, masses, or tenderness   Back:   Symmetric, no curvature, ROM normal, no CVA tenderness   Lungs:   Clear to auscultation bilaterally; respirations regular, even, and unlabored bilaterally   Heart:  Regular rate and rhythm, no murmurs appreciated   Breasts:  Symmetrical, no masses, no lesions, no nipple discharge and fibrocystic changes bilaterally with no obvious masses   Abdomen:   Soft, non-tender, non-distended and no organomegaly   Lymph nodes: No cervical, supraclavicular, inguinal or axillary adenopathy noted   Extremities: Normal, atraumatic; no clubbing, cyanosis, or edema    Pelvic: External Genitalia  without lesions or skin changes  Vagina  is pink, moist, without lesions.   Vaginal Cuff  Female Vaginal Cuff: smooth, intact and without visible lesions  Uterus  surgically absent  Ovaries  surgically absent bilaterallly and without palpable masses or fullness  Parametria  smooth  Rectovaginal  Female rectovaginal: deferred     ECOG Performance Status: 0 - Asymptomatic    Procedure Note:  No notes on file      Assessment and Plan:  Gertrudis was seen today for follow-up.    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 She will be notified of CA-125 results upon their return. She is now 2.5 years from completion of treatment and doing very well. She should continue every 6 month cancer surveillance visits. Her clinical high risk breast exams can be performed at the same time. She is understanding to call with any changes in pelvic symptoms or general GYN concerns at any time between regularly scheduled visits.     Follow-up with PCP and PT regarding right sciatica.     Reviewed screening NCCN recommended screening schedule for CHEK2+ as follows:  Monthly breast exams starting at age 18.   Clinical breast exam every 6 months.  Annual mammogram starting at age 40, or earlier based upon family history.  Annual breast MRI starting at age 40.  Consider chemoprevention for breast cancer risk reduction (Tamoxifen or Raloxifene).  Consider risk-reducing mastectomy.  Colonoscopies every 5 years starting at age 40, or earlier based upon family history.    Discussed the purpose of high risk clinic in coordinating, scheduling, and planning screening interventions to include yearly mammograms, yearly screening breast MRI, Q6 month CBE, and monthly SBE. Colonoscopy every 5 years is recommended. GYN screening per national guidelines.   Reviewed technique for SBE including concerning findings to report.  Discussed tamoxifen/evista use for chemoprevention including risk and benefits of use as well as side effects of medication-pt declines at this time  Discussed screening breast MRI scheduling based on menstrual cycles, HRT. We also discussed the increased sensitivity of MRI screening and possibility of call-backs for additional imaging or biopsies to establish baseline.   Discussed vitamin D as benefit to bone health and possible benefit to breast health. Recommended 1000 IU daily unless contraindicated  The patient declines surgical intervention at this time,  but the options were presented to her as education and she  knows she can request further information or referral for consulation if she desires in the future.   Discussed benefit of Vitamin E for fibrocystic breasts/breast pain.  Screening mammogram ordered today. Breast MRI will be due in 7/2019. Patient has not yet completed initial colonoscopy due to out-of-pocket cost. She states her PCP is supposed to be referring her to a local GI for her screening study. If not completed at her next high risk visit, I am happy to give new referral to GI of her choosing.       Return to clinic in 6 months for ongoing cancer surveillance and High Risk visit.      Jazzmine Herring, NELSON        Note: Speech recognition transcription software was used to dictate portions of this document.  An attempt at proofreading has been made though minor errors in transcription may still be present.  Please do not hesitate to call our office with any questions.

## 2019-01-30 NOTE — TELEPHONE ENCOUNTER
----- Message from NELSON Stone sent at 1/30/2019  1:19 PM EST -----  Please notify CA-125 low! Thanks!

## 2019-03-12 ENCOUNTER — HOSPITAL ENCOUNTER (OUTPATIENT)
Dept: MAMMOGRAPHY | Facility: HOSPITAL | Age: 49
Discharge: HOME | End: 2019-03-12
Admitting: NURSE PRACTITIONER

## 2019-03-12 DIAGNOSIS — Z15.01 MONOALLELIC MUTATION OF CHEK2 GENE IN FEMALE PATIENT: ICD-10-CM

## 2019-03-12 DIAGNOSIS — Z15.89 MONOALLELIC MUTATION OF CHEK2 GENE IN FEMALE PATIENT: ICD-10-CM

## 2019-03-12 DIAGNOSIS — Z15.09 MONOALLELIC MUTATION OF CHEK2 GENE IN FEMALE PATIENT: ICD-10-CM

## 2019-03-12 DIAGNOSIS — Z15.02 MONOALLELIC MUTATION OF CHEK2 GENE IN FEMALE PATIENT: ICD-10-CM

## 2019-03-12 DIAGNOSIS — Z91.89 AT HIGH RISK FOR BREAST CANCER: ICD-10-CM

## 2019-03-12 PROCEDURE — 77063 BREAST TOMOSYNTHESIS BI: CPT

## 2019-03-12 PROCEDURE — 77067 SCR MAMMO BI INCL CAD: CPT

## 2019-03-12 PROCEDURE — 77063 BREAST TOMOSYNTHESIS BI: CPT | Performed by: RADIOLOGY

## 2019-03-12 PROCEDURE — 77067 SCR MAMMO BI INCL CAD: CPT | Performed by: RADIOLOGY

## 2019-05-15 DIAGNOSIS — Z15.09 MONOALLELIC MUTATION OF CHEK2 GENE IN FEMALE PATIENT: Primary | ICD-10-CM

## 2019-05-15 DIAGNOSIS — Z15.02 MONOALLELIC MUTATION OF CHEK2 GENE IN FEMALE PATIENT: Primary | ICD-10-CM

## 2019-05-15 DIAGNOSIS — Z15.01 MONOALLELIC MUTATION OF CHEK2 GENE IN FEMALE PATIENT: Primary | ICD-10-CM

## 2019-05-15 DIAGNOSIS — Z15.89 MONOALLELIC MUTATION OF CHEK2 GENE IN FEMALE PATIENT: Primary | ICD-10-CM

## 2019-05-15 DIAGNOSIS — Z91.89 AT HIGH RISK FOR BREAST CANCER: ICD-10-CM

## 2019-07-19 ENCOUNTER — HOSPITAL ENCOUNTER (OUTPATIENT)
Dept: MRI IMAGING | Facility: HOSPITAL | Age: 49
Discharge: HOME | End: 2019-07-19
Admitting: NURSE PRACTITIONER

## 2019-07-19 DIAGNOSIS — Z15.09 MONOALLELIC MUTATION OF CHEK2 GENE IN FEMALE PATIENT: ICD-10-CM

## 2019-07-19 DIAGNOSIS — Z15.02 MONOALLELIC MUTATION OF CHEK2 GENE IN FEMALE PATIENT: ICD-10-CM

## 2019-07-19 DIAGNOSIS — Z91.89 AT HIGH RISK FOR BREAST CANCER: ICD-10-CM

## 2019-07-19 DIAGNOSIS — Z15.01 MONOALLELIC MUTATION OF CHEK2 GENE IN FEMALE PATIENT: ICD-10-CM

## 2019-07-19 DIAGNOSIS — Z15.89 MONOALLELIC MUTATION OF CHEK2 GENE IN FEMALE PATIENT: ICD-10-CM

## 2019-07-19 PROCEDURE — 77049 MRI BREAST C-+ W/CAD BI: CPT | Performed by: RADIOLOGY

## 2019-07-19 PROCEDURE — A9577 INJ MULTIHANCE: HCPCS | Performed by: NURSE PRACTITIONER

## 2019-07-19 PROCEDURE — 0 GADOBENATE DIMEGLUMINE 529 MG/ML SOLUTION: Performed by: NURSE PRACTITIONER

## 2019-07-19 PROCEDURE — C8908 MRI W/O FOL W/CONT, BREAST,: HCPCS

## 2019-07-19 RX ADMIN — GADOBENATE DIMEGLUMINE 20 ML: 529 INJECTION, SOLUTION INTRAVENOUS at 11:41

## 2019-07-23 ENCOUNTER — TELEPHONE (OUTPATIENT)
Dept: MRI IMAGING | Facility: HOSPITAL | Age: 49
End: 2019-07-23

## 2019-08-20 ENCOUNTER — TELEPHONE (OUTPATIENT)
Dept: GYNECOLOGIC ONCOLOGY | Facility: CLINIC | Age: 49
End: 2019-08-20

## 2019-08-20 NOTE — TELEPHONE ENCOUNTER
Patient called and wanted advice on hemorrhoids. Advised for use of witch hazel or tucks pads. otc hydrocortisone suppositories to reduce swelling. She accepted advice.

## 2019-09-16 ENCOUNTER — OFFICE VISIT (OUTPATIENT)
Dept: GYNECOLOGIC ONCOLOGY | Facility: CLINIC | Age: 49
End: 2019-09-16

## 2019-09-16 ENCOUNTER — APPOINTMENT (OUTPATIENT)
Dept: LAB | Facility: HOSPITAL | Age: 49
End: 2019-09-16

## 2019-09-16 VITALS
BODY MASS INDEX: 32.12 KG/M2 | OXYGEN SATURATION: 99 % | DIASTOLIC BLOOD PRESSURE: 84 MMHG | WEIGHT: 199 LBS | SYSTOLIC BLOOD PRESSURE: 139 MMHG | TEMPERATURE: 97.6 F | HEART RATE: 65 BPM | RESPIRATION RATE: 16 BRPM

## 2019-09-16 DIAGNOSIS — C56.1 MALIGNANT NEOPLASM OF RIGHT OVARY (HCC): Primary | ICD-10-CM

## 2019-09-16 DIAGNOSIS — Z15.01 MONOALLELIC MUTATION OF CHEK2 GENE IN FEMALE PATIENT: ICD-10-CM

## 2019-09-16 DIAGNOSIS — Z91.89 AT HIGH RISK FOR BREAST CANCER: ICD-10-CM

## 2019-09-16 DIAGNOSIS — Z15.02 MONOALLELIC MUTATION OF CHEK2 GENE IN FEMALE PATIENT: ICD-10-CM

## 2019-09-16 DIAGNOSIS — Z15.89 MONOALLELIC MUTATION OF CHEK2 GENE IN FEMALE PATIENT: ICD-10-CM

## 2019-09-16 DIAGNOSIS — Z15.09 MONOALLELIC MUTATION OF CHEK2 GENE IN FEMALE PATIENT: ICD-10-CM

## 2019-09-16 LAB — CANCER AG125 SERPL QL: 12 U/ML (ref 0–38.1)

## 2019-09-16 PROCEDURE — 99214 OFFICE O/P EST MOD 30 MIN: CPT | Performed by: NURSE PRACTITIONER

## 2019-09-16 PROCEDURE — 36415 COLL VENOUS BLD VENIPUNCTURE: CPT | Performed by: NURSE PRACTITIONER

## 2019-09-16 PROCEDURE — 86304 IMMUNOASSAY TUMOR CA 125: CPT | Performed by: NURSE PRACTITIONER

## 2019-09-16 NOTE — PROGRESS NOTES
GYN ONCOLOGY CANCER SURVEILLANCE FOLLOW-UP AND HIGH RISK    Gertrudis Yost  [id number]  1970    Chief Complaint: Follow-up (Had 3 episodes of n/v over last 1 month)        History of present illness:  Gertrudis Yost is a 49 y.o. year old female who is here today for ongoing surveillance of Ovarian Cancer, see Cancer History, and high risk visit. She is now 3 years out from completion of treatment. CA-125 was stable in 20's for some time, then has been less than 10 for the last year. She is due to repeat this today. She denies vaginal bleeding, pelvic pain, abdominal swelling/fullness, and changes in bowel or bladder function.     Gertrudis has tested positive for CHEK2 mutation and is followed for high risk status as well. This has been estimated to increase the risk of female breast cancer to somewhere between 24-48% depending on family history.  There is also a possible increased risk for colorectal cancer in both men and women up to 9.5%. She had her routine screening mammogram 03/12/2019 and will be due to repeat this annually. High risk breast MRI is also UTD, repeat in 7/2020.  She has not yet completed colonoscopy due to high out-of-pocket cost for prep.     Upon arrival patient reports 3 episodes of n/v. This happened weekly on Thursday mornings upon waking, resolved within an hour. There have been no other symptoms any other days. She has notified her PCP and has been instructed to monitor symptoms. She is unsure if this is related to any particular foods. She denies appetite changes or bowel changes. She is asymptomatic today.       Cancer History:      Ovarian cancer (CMS/HCC)    4/2016 Imaging     20 cm right pelvic mass seen upon CT during work-up for abdominal pain with leukocytosis and emesis. Referred to Gyn Oncology. CA-125 = 147 and CEA = 1.9         4/11/2016 Surgery     Laparotomy and debulking with Dr. Roman along with cholecystectomy by Dr. Leslie.   Cancer staging surgery included MORE/BSO,  bilateral pelvic and periaortic lymph node dissection, omentectomy, appendectomy, and peritoneal stripping. There was intra-operative spill of the right ovarian mass during surgery. 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  Washings negative for malignant cells.  No LVSI, no ovarian surface involvement, omentum negative, and all sampled nodes negative.  Due to intraoperative rupture, final Stage ICI grade 2         5/2/2016 Surgery     Port-A-Cath placement           5/11/2016 - 8/31/2016 Chemotherapy     Carbo/Taxol every 3 weeks × 6 cycles         10/25/2016 Imaging     Posttreatment CT scan showed only a small node in the left external iliac chain not meeting criteria for malignant adenopathy, unchanged from preop scan.  Study otherwise negative.         9/7/2017 Surgery     Port-A-Cath removal           9/2017 Genetic Testing     Genetic testing was positive for CHEK2 gene (c.470T>C)            Past Medical History:   Diagnosis Date   • Abdominal pain    • Anxiety    • BMI 29.0-29.9,adult     BMI > = 18 - 29.6   • Chronic narcotic use     Chronic neck pain.  Dr. Mian Somers.   • Chronic neck pain    • Current every day smoker    • Dehydration    • Drug therapy 2016   • Emesis 8/25/2016   • GERD (gastroesophageal reflux disease)    • Leukocytosis 8/25/2016   • Ovarian cancer (CMS/HCC) 04/2016    Stage 1C1 mucinous adenocarcinoma       Past Surgical History:   Procedure Laterality Date   • APPENDECTOMY  04/2016   • CHOLECYSTECTOMY OPEN  04/2016   • MANDIBLE FRACTURE SURGERY      jaw broke and reset   • TOTAL ABDOMINAL HYSTERECTOMY WITH SALPINGO OOPHORECTOMY Bilateral 04/2016    Ovarian cancer debulking   • VENOUS ACCESS DEVICE (PORT) INSERTION Left 05/2016       MEDICATIONS: The current medication list was reviewed and reconciled.     Allergies:  has No Known Allergies.    Family History   Problem Relation Age of Onset   • Heart disease Mother    •  Breast cancer Maternal Grandmother         DX AGE UNKNOWN   • BRCA 1/2 Neg Hx    • Colon cancer Neg Hx    • Endometrial cancer Neg Hx    • Ovarian cancer Neg Hx      Health Maintenance:    Regular self breast exam: no  Mammogram: 3/12/2019. History of abnormal mammogram: yes - follow-up negative  Breast MRI: 7/19/2019. Results: negative     Risk Asessment: Genetic Testing: CHEK2+    Last imaging study was post-treatment CT 10/25/2016.  Tumor marker:     Date Value Ref Range Status   01/30/2019 10.0 0.0 - 30.2 U/mL Final   07/24/2018 7.7 0.0 - 30.2 U/mL Final   04/24/2018 6.6 0.0 - 30.2 U/mL Final   08/09/2017 27.9 0.0 - 30.2 U/mL Final       Review of Systems   Constitutional: Negative for appetite change, chills, fatigue, fever and unexpected weight change.   Respiratory: Negative for cough, shortness of breath and wheezing.    Cardiovascular: Negative for chest pain, palpitations and leg swelling.   Gastrointestinal: Negative for abdominal distention, abdominal pain, blood in stool, constipation, diarrhea, nausea and vomiting.   Endocrine: Negative.    Genitourinary: Negative for dyspareunia, dysuria, frequency, genital sores, hematuria, pelvic pain, urgency, vaginal bleeding, vaginal discharge and vaginal pain.   Musculoskeletal: Negative for arthralgias, gait problem and joint swelling.   Neurological: Negative for dizziness, seizures, syncope, weakness, light-headedness, numbness and headaches.   Hematological: Negative for adenopathy.   Psychiatric/Behavioral: Negative.        Physical Exam  Vital Signs: /84   Pulse 65   Temp 97.6 °F (36.4 °C) (Temporal)   Resp 16   Wt 90.3 kg (199 lb)   SpO2 99%   BMI 32.12 kg/m²   Pain Score    09/16/19 1340   PainSc: 0-No pain      General Appearance:  alert, cooperative, no apparent distress and appears stated age   Neurologic/Psychiatric: A&O x 3, gait steady, appropriate affect   HEENT:  Normocephalic, without obvious abnormality, mucous membranes moist    Neck: Supple, symmetrical, trachea midline, no adenopathy;  No thyromegaly, masses, or tenderness   Back:   Symmetric, no curvature, ROM normal, no CVA tenderness   Lungs:   Clear to auscultation bilaterally; respirations regular, even, and unlabored bilaterally   Heart:  Regular rate and rhythm, no murmurs appreciated   Breasts:  Symmetrical, no masses, no lesions and no nipple discharge   Abdomen:   Soft, non-tender, non-distended and no organomegaly   Lymph nodes: No cervical, supraclavicular, inguinal or axillary adenopathy noted   Extremities: Normal, atraumatic; no clubbing, cyanosis, or edema    Pelvic: External Genitalia  without lesions or skin changes  Vagina  is pink, moist, without lesions.   Vaginal Cuff  Female Vaginal Cuff: smooth, intact and without visible lesions  Uterus  surgically absent  Ovaries  surgically absent bilaterallly and without palpable masses or fullness  Parametria  smooth  Rectovaginal  Female rectovaginal: deferred     ECOG Performance Status: 0 - Asymptomatic    Procedure Note:  No notes on file      Assessment and Plan:  Gertrudis was seen today for follow-up.    Diagnoses and all orders for this visit:    Malignant neoplasm of right ovary (CMS/HCC)  -         Monoallelic mutation of CHEK2 gene in female patient    At high risk for breast cancer        There is no evidence of disease upon today's exam. She will be notified of CA-125 results upon their return. She is now 3 years from completion of treatment and doing very well. She should continue every 6 month cancer surveillance visits. Her clinical high risk breast exams can be performed at the same time. She is understanding to call with any changes in pelvic symptoms or general GYN concerns at any time between regularly scheduled visits.       Reviewed screening NCCN recommended screening schedule for CHEK2+ as follows:  Monthly breast exams starting at age 18.   Clinical breast exam every 6 months.  Annual mammogram  starting at age 40, or earlier based upon family history.  Annual breast MRI starting at age 40.  Consider chemoprevention for breast cancer risk reduction (Tamoxifen or Raloxifene).  Consider risk-reducing mastectomy.  Colonoscopies every 5 years starting at age 40, or earlier based upon family history.     Discussed the purpose of high risk clinic in coordinating, scheduling, and planning screening interventions to include yearly mammograms, yearly screening breast MRI, Q6 month CBE, and monthly SBE. Colonoscopy every 5 years is recommended. GYN screening per national guidelines.   Reviewed technique for SBE including concerning findings to report.  Discussed tamoxifen/evista use for chemoprevention including risk and benefits of use as well as side effects of medication-pt declines at this time  Discussed screening breast MRI scheduling based on menstrual cycles, HRT. We also discussed the increased sensitivity of MRI screening and possibility of call-backs for additional imaging or biopsies to establish baseline. Repeat MRI 7/2020, will order at next visit.  Discussed vitamin D as benefit to bone health and possible benefit to breast health. Recommended 1000 IU daily unless contraindicated  Discussed benefit of Vitamin E for fibrocystic breasts/breast pain.  Routine screening mammogram due 03/13/2020.  Patient has not yet completed initial colonoscopy due to out-of-pocket cost. She states her PCP is supposed to be referring her to a local GI for her screening study.            Return to clinic in 6 months for ongoing cancer surveillance and High Risk visit.        NELSON Stone

## 2019-09-17 ENCOUNTER — TELEPHONE (OUTPATIENT)
Dept: GYNECOLOGIC ONCOLOGY | Facility: CLINIC | Age: 49
End: 2019-09-17

## 2019-09-17 NOTE — TELEPHONE ENCOUNTER
----- Message from NELSON Stone sent at 9/17/2019 10:48 AM EDT -----  Please notify CA-125 low/stable. Thanks

## 2020-04-20 ENCOUNTER — TRANSCRIBE ORDERS (OUTPATIENT)
Dept: ADMINISTRATIVE | Facility: HOSPITAL | Age: 50
End: 2020-04-20

## 2020-04-20 DIAGNOSIS — Z12.31 VISIT FOR SCREENING MAMMOGRAM: Primary | ICD-10-CM

## 2020-05-13 ENCOUNTER — APPOINTMENT (OUTPATIENT)
Dept: LAB | Facility: HOSPITAL | Age: 50
End: 2020-05-13

## 2020-05-13 ENCOUNTER — OFFICE VISIT (OUTPATIENT)
Dept: GYNECOLOGIC ONCOLOGY | Facility: CLINIC | Age: 50
End: 2020-05-13

## 2020-05-13 VITALS
BODY MASS INDEX: 32.28 KG/M2 | WEIGHT: 200 LBS | TEMPERATURE: 97.8 F | SYSTOLIC BLOOD PRESSURE: 126 MMHG | DIASTOLIC BLOOD PRESSURE: 89 MMHG | HEART RATE: 71 BPM | RESPIRATION RATE: 14 BRPM | OXYGEN SATURATION: 98 %

## 2020-05-13 DIAGNOSIS — Z15.89 MONOALLELIC MUTATION OF CHEK2 GENE IN FEMALE PATIENT: ICD-10-CM

## 2020-05-13 DIAGNOSIS — Z15.09 MONOALLELIC MUTATION OF CHEK2 GENE IN FEMALE PATIENT: ICD-10-CM

## 2020-05-13 DIAGNOSIS — Z15.02 MONOALLELIC MUTATION OF CHEK2 GENE IN FEMALE PATIENT: ICD-10-CM

## 2020-05-13 DIAGNOSIS — Z91.89 AT HIGH RISK FOR BREAST CANCER: ICD-10-CM

## 2020-05-13 DIAGNOSIS — C56.1 MALIGNANT NEOPLASM OF RIGHT OVARY (HCC): Primary | ICD-10-CM

## 2020-05-13 DIAGNOSIS — Z15.01 MONOALLELIC MUTATION OF CHEK2 GENE IN FEMALE PATIENT: ICD-10-CM

## 2020-05-13 LAB — CANCER AG125 SERPL QL: 11.7 U/ML (ref 0–38.1)

## 2020-05-13 PROCEDURE — 86304 IMMUNOASSAY TUMOR CA 125: CPT | Performed by: NURSE PRACTITIONER

## 2020-05-13 PROCEDURE — 36415 COLL VENOUS BLD VENIPUNCTURE: CPT | Performed by: NURSE PRACTITIONER

## 2020-05-13 PROCEDURE — 99215 OFFICE O/P EST HI 40 MIN: CPT | Performed by: NURSE PRACTITIONER

## 2020-05-13 NOTE — PROGRESS NOTES
GYN ONCOLOGY CANCER SURVEILLANCE FOLLOW-UP    Gertrudis Yost  [id number]  1970    Chief Complaint: Follow-up (abdominal pain, local ER last night)        History of present illness:  Gertrudis Yost is a 49 y.o. year old female who is here today for ongoing surveillance of Ovarian Cancer, see Cancer History, and high risk visit. She will reach 4 year from completion of treatment later this year. CA-125 has been low/stable, due to repeat today.     Gertrudis has tested positive for CHEK2 mutation and is followed for high risk status as well. This has been estimated to increase the risk of female breast cancer to somewhere between 24-48% depending on family history.  There is also a possible increased risk for colorectal cancer in both men and women up to 9.5%. She last complete screening mammogram 3/12/2019 and is scheduled to be repeated on 6/18/2020. She has been delayed slighly secondary to COVID-19.  High risk breast MRI is UTD, last 7/19/2019.  She has not yet completed colonoscopy due to high out-of-pocket cost for prep.     Upon arrival today patient reports she was seen in the ED at [facility] last night. She had acute generalized abdominal pain that started at 3:00pm and worsened over several hours. Denies vomiting. She presented to the ED at 8:30 pm, reports her pain was 10/10 and very tender to the touch throughout her abdomen. She states she was given phenergan IV, possibly given pain medicine, and a CT scan was performed. We do not have these records at the time of this visit. Patient states pain has been better today, rates 2/10 at present.           Cancer History:      Ovarian cancer (CMS/HCC)    4/2016 Imaging     20 cm right pelvic mass seen upon CT during work-up for abdominal pain with leukocytosis and emesis. Referred to Gyn Oncology. CA-125 = 147 and CEA = 1.9      4/11/2016 Surgery     Laparotomy and debulking with Dr. Roman along with cholecystectomy by Dr. Leslie.   Cancer  staging surgery included MORE/BSO, bilateral pelvic and periaortic lymph node dissection, omentectomy, appendectomy, and peritoneal stripping. There was intra-operative spill of the right ovarian mass during surgery. 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  Washings negative for malignant cells.  No LVSI, no ovarian surface involvement, omentum negative, and all sampled nodes negative.  Due to intraoperative rupture, final Stage ICI grade 2      5/2/2016 Surgery     Port-A-Cath placement        5/11/2016 - 8/31/2016 Chemotherapy     Carbo/Taxol every 3 weeks × 6 cycles      10/25/2016 Imaging     Posttreatment CT scan showed only a small node in the left external iliac chain not meeting criteria for malignant adenopathy, unchanged from preop scan.  Study otherwise negative.      9/7/2017 Surgery     Port-A-Cath removal        9/2017 Genetic Testing     Genetic testing was positive for CHEK2 gene (c.470T>C)         Past Medical History:   Diagnosis Date   • Abdominal pain    • Anxiety    • BMI 29.0-29.9,adult     BMI > = 18 - 29.6   • Chronic narcotic use     Chronic neck pain.  Dr. Mian Somers.   • Chronic neck pain    • Current every day smoker    • Dehydration    • Drug therapy 2016   • Emesis 8/25/2016   • GERD (gastroesophageal reflux disease)    • Leukocytosis 8/25/2016   • Ovarian cancer (CMS/HCC) 04/2016    Stage 1C1 mucinous adenocarcinoma       Past Surgical History:   Procedure Laterality Date   • APPENDECTOMY  04/2016   • CHOLECYSTECTOMY OPEN  04/2016   • MANDIBLE FRACTURE SURGERY      jaw broke and reset   • TOTAL ABDOMINAL HYSTERECTOMY WITH SALPINGO OOPHORECTOMY Bilateral 04/2016    Ovarian cancer debulking   • VENOUS ACCESS DEVICE (PORT) INSERTION Left 05/2016       MEDICATIONS: The current medication list was reviewed and reconciled.     Allergies:  has No Known Allergies.    Family History   Problem Relation Age of Onset   • Heart  disease Mother    • Breast cancer Maternal Grandmother         DX AGE UNKNOWN   • BRCA 1/2 Neg Hx    • Colon cancer Neg Hx    • Endometrial cancer Neg Hx    • Ovarian cancer Neg Hx        Last imaging study was CT abdomen pelvis last night ([facility], outside facility, no records at time of visit).   Tumor marker:     Date Value Ref Range Status   05/13/2020 11.7 0.0 - 38.1 U/mL Final   09/16/2019 12.0 0.0 - 38.1 U/mL Final   01/30/2019 10.0 0.0 - 30.2 U/mL Final   07/24/2018 7.7 0.0 - 30.2 U/mL Final       Review of Systems   Constitutional: Negative for appetite change, chills, fatigue, fever and unexpected weight change.   Respiratory: Negative for cough, shortness of breath and wheezing.    Cardiovascular: Negative for chest pain, palpitations and leg swelling.   Gastrointestinal: Negative for abdominal distention, abdominal pain, blood in stool, constipation, diarrhea, nausea and vomiting.   Endocrine: Negative.    Genitourinary: Negative for dyspareunia, dysuria, frequency, genital sores, hematuria, pelvic pain, urgency, vaginal bleeding, vaginal discharge and vaginal pain.   Musculoskeletal: Negative for arthralgias, gait problem and joint swelling.   Neurological: Negative for dizziness, seizures, syncope, weakness, light-headedness, numbness and headaches.   Hematological: Negative for adenopathy.   Psychiatric/Behavioral: Negative.          Physical Exam  Vital Signs: /89   Pulse 71   Temp 97.8 °F (36.6 °C) (Temporal)   Resp 14   Wt 90.7 kg (200 lb)   SpO2 98%   BMI 32.28 kg/m²   Vitals:    05/13/20 1421   PainSc:   2   PainLoc: Abdomen           General Appearance:  alert, cooperative, no apparent distress and appears stated age   Neurologic/Psychiatric: A&O x 3, gait steady, appropriate affect   HEENT:  Normocephalic, without obvious abnormality, mucous membranes moist   Neck: Supple, symmetrical, trachea midline, no adenopathy;  No thyromegaly, masses, or tenderness   Back:    Symmetric, no curvature, ROM normal, no CVA tenderness   Lungs:   Clear to auscultation bilaterally; respirations regular, even, and unlabored bilaterally   Heart:  Regular rate and rhythm, no murmurs appreciated   Breasts:  Symmetrical, no masses, no lesions and no nipple discharge   Abdomen:   Soft, non-distended, no organomegaly and generalized mild tenderness. No rebound or guarding   Lymph nodes: No cervical, supraclavicular, inguinal adenopathy noted   Extremities: Normal, atraumatic; no clubbing, cyanosis, or edema    Pelvic: External Genitalia  without lesions or skin changes  Vagina  is pink, moist, without lesions.   Vaginal Cuff  Female Vaginal Cuff: smooth, intact and without visible lesions  Uterus  surgically absent  Ovaries  surgically absent bilaterally and without palpable masses or fullness  Parametria  smooth  Rectovaginal  Female rectovaginal: deferred     ECOG Performance Status: (0) Fully Active - Able to Carry On All Pre-disease Performance Without Restriction    Procedure Note:  No notes on file      Assessment and Plan:  Gertrudis was seen today for follow-up.    Diagnoses and all orders for this visit:    Malignant neoplasm of right ovary (CMS/HCC)  -         Monoallelic mutation of CHEK2 gene in female patient    At high risk for breast cancer          There is no evidence of disease upon today's exam. She will be notified of CA-125 results upon their return. I am going to request records from outside ED and CT scan for review. Explained to patient I will call her with my impression once reviewed. If no evidence of disease, she will be referred as appropriate for management.       Reviewed screening NCCN recommended screening schedule for CHEK2+ as follows:  Monthly breast exams starting at age 18.   Clinical breast exam every 6 months.  Annual mammogram starting at age 40, or earlier based upon family history.  Annual breast MRI starting at age 40.  Consider chemoprevention for breast  cancer risk reduction (Tamoxifen or Raloxifene).  Consider risk-reducing mastectomy.  Colonoscopies every 5 years starting at age 40, or earlier based upon family history.     Discussed the purpose of high risk clinic in coordinating, scheduling, and planning screening interventions to include yearly mammograms, yearly screening breast MRI, Q6 month CBE, and monthly SBE. Colonoscopy every 5 years is recommended. GYN screening per national guidelines.   Reviewed technique for SBE including concerning findings to report.  Discussed tamoxifen/evista use for chemoprevention including risk and benefits of use as well as side effects of medication-pt declines at this time  Discussed screening breast MRI scheduling based on menstrual cycles, HRT. We also discussed the increased sensitivity of MRI screening and possibility of call-backs for additional imaging or biopsies to establish baseline. Repeat MRI will be needed 6 months after upcoming mammogram, will order with next visit.   Discussed vitamin D as benefit to bone health and possible benefit to breast health. Recommended 1000 IU daily unless contraindicated  Discussed benefit of Vitamin E for fibrocystic breasts/breast pain.  Routine screening mammogram scheduled 6/18/2020.   Patient has not yet completed initial colonoscopy due to out-of-pocket cost. She states her PCP is supposed to be referring her to a local GI for her screening study.       Pain assessment was performed today as a part of patient’s care.  For patients with pain related to surgery, gynecologic malignancy or cancer treatment, the plan is as noted in the assessment/plan.  For patients with pain not related to these issues, they are to seek any further needed care from a more appropriate provider, such as PCP.      Return to clinic in 6 months for ongoing cancer surveillance and High Risk visit.      Jazzmine Herring, NELSON

## 2020-05-15 ENCOUNTER — TELEPHONE (OUTPATIENT)
Dept: GYNECOLOGIC ONCOLOGY | Facility: CLINIC | Age: 50
End: 2020-05-15

## 2020-05-15 NOTE — TELEPHONE ENCOUNTER
Patient would like to speak to Ana regarding a report that she received from [facility]. She said she has spoken to her a couple of times today about it.    Unable to transfer the call at this time.    Please call her at- [phone]

## 2020-05-15 NOTE — TELEPHONE ENCOUNTER
----- Message from NELSON Stone sent at 5/14/2020  9:13 AM EDT -----  Please notify CA-125 low. I am awaiting her ED records, will call her once I have reviewed. Thanks!    05/15/2020:  I called pt and informed of ca-125 results. She v/u.

## 2020-05-20 ENCOUNTER — TELEPHONE (OUTPATIENT)
Dept: GYNECOLOGIC ONCOLOGY | Facility: CLINIC | Age: 50
End: 2020-05-20

## 2020-05-20 DIAGNOSIS — K43.9 ABDOMINAL WALL HERNIA: Primary | ICD-10-CM

## 2020-05-20 NOTE — TELEPHONE ENCOUNTER
Attempted to call patient to review CT results (disc and report from outside facility reviewed). There was no answer, voicemail message left for patient to return call to our office at her convenience.

## 2020-05-20 NOTE — TELEPHONE ENCOUNTER
Patient returned my call. I [facility] I agree THONY, 2 fat-containing abdominal wall hernias noted. Patient is interested in referral to general surgery to see if she would be a candidate for surgical repair. Referral ordered. Outside disc and report will be uploaded into EMR.

## 2020-06-18 ENCOUNTER — HOSPITAL ENCOUNTER (OUTPATIENT)
Dept: MAMMOGRAPHY | Facility: HOSPITAL | Age: 50
Discharge: HOME | End: 2020-06-18
Admitting: NURSE PRACTITIONER

## 2020-06-18 DIAGNOSIS — Z12.31 VISIT FOR SCREENING MAMMOGRAM: ICD-10-CM

## 2020-06-18 PROCEDURE — 77063 BREAST TOMOSYNTHESIS BI: CPT | Performed by: RADIOLOGY

## 2020-06-18 PROCEDURE — 77063 BREAST TOMOSYNTHESIS BI: CPT

## 2020-06-18 PROCEDURE — 77067 SCR MAMMO BI INCL CAD: CPT

## 2020-06-18 PROCEDURE — 77067 SCR MAMMO BI INCL CAD: CPT | Performed by: RADIOLOGY

## 2020-07-08 ENCOUNTER — TRANSCRIBE ORDERS (OUTPATIENT)
Dept: ADMINISTRATIVE | Facility: HOSPITAL | Age: 50
End: 2020-07-08

## 2020-07-08 DIAGNOSIS — K43.2 INCISIONAL HERNIA WITHOUT OBSTRUCTION OR GANGRENE: Primary | ICD-10-CM

## 2020-07-27 ENCOUNTER — HOSPITAL ENCOUNTER (OUTPATIENT)
Dept: CT IMAGING | Facility: HOSPITAL | Age: 50
Discharge: HOME | End: 2020-07-27
Admitting: SURGERY

## 2020-07-27 DIAGNOSIS — K43.2 INCISIONAL HERNIA WITHOUT OBSTRUCTION OR GANGRENE: ICD-10-CM

## 2020-07-27 PROCEDURE — 74176 CT ABD & PELVIS W/O CONTRAST: CPT

## 2020-11-13 ENCOUNTER — DOCUMENTATION (OUTPATIENT)
Dept: GYNECOLOGIC ONCOLOGY | Facility: CLINIC | Age: 50
End: 2020-11-13

## 2020-11-16 ENCOUNTER — TELEPHONE (OUTPATIENT)
Dept: GYNECOLOGIC ONCOLOGY | Facility: CLINIC | Age: 50
End: 2020-11-16

## 2020-11-16 NOTE — TELEPHONE ENCOUNTER
PT: SELF      PT WANTING TO TALK TO CHOLO WOULD NOT GIVE INFORMATION AS TO WHY SHE WANTS TO SPEAK TO HER, PT STATED IT WAS NONE OF OUR BUSINESS AS TO WHY SHE'S CALLING....     PT # [phone]

## 2020-11-24 ENCOUNTER — LAB (OUTPATIENT)
Dept: LAB | Facility: HOSPITAL | Age: 50
End: 2020-11-24

## 2020-11-24 ENCOUNTER — OFFICE VISIT (OUTPATIENT)
Dept: GYNECOLOGIC ONCOLOGY | Facility: CLINIC | Age: 50
End: 2020-11-24

## 2020-11-24 VITALS
HEART RATE: 63 BPM | SYSTOLIC BLOOD PRESSURE: 164 MMHG | OXYGEN SATURATION: 98 % | BODY MASS INDEX: 31.31 KG/M2 | DIASTOLIC BLOOD PRESSURE: 94 MMHG | TEMPERATURE: 98 F | RESPIRATION RATE: 16 BRPM | WEIGHT: 194 LBS

## 2020-11-24 DIAGNOSIS — Z15.89 MONOALLELIC MUTATION OF CHEK2 GENE IN FEMALE PATIENT: ICD-10-CM

## 2020-11-24 DIAGNOSIS — Z15.09 MONOALLELIC MUTATION OF CHEK2 GENE IN FEMALE PATIENT: ICD-10-CM

## 2020-11-24 DIAGNOSIS — Z87.19 H/O SMALL BOWEL OBSTRUCTION: ICD-10-CM

## 2020-11-24 DIAGNOSIS — Z15.02 MONOALLELIC MUTATION OF CHEK2 GENE IN FEMALE PATIENT: ICD-10-CM

## 2020-11-24 DIAGNOSIS — Z91.89 AT HIGH RISK FOR BREAST CANCER: ICD-10-CM

## 2020-11-24 DIAGNOSIS — Z15.01 MONOALLELIC MUTATION OF CHEK2 GENE IN FEMALE PATIENT: ICD-10-CM

## 2020-11-24 DIAGNOSIS — C56.1 MALIGNANT NEOPLASM OF RIGHT OVARY (HCC): Primary | ICD-10-CM

## 2020-11-24 DIAGNOSIS — Z12.11 SCREENING FOR COLON CANCER: ICD-10-CM

## 2020-11-24 DIAGNOSIS — F17.200 CURRENT EVERY DAY SMOKER: ICD-10-CM

## 2020-11-24 LAB — CANCER AG125 SERPL QL: 13.2 U/ML (ref 0–38.1)

## 2020-11-24 PROCEDURE — 99215 OFFICE O/P EST HI 40 MIN: CPT | Performed by: NURSE PRACTITIONER

## 2020-11-24 PROCEDURE — 36415 COLL VENOUS BLD VENIPUNCTURE: CPT | Performed by: NURSE PRACTITIONER

## 2020-11-24 PROCEDURE — 99406 BEHAV CHNG SMOKING 3-10 MIN: CPT | Performed by: NURSE PRACTITIONER

## 2020-11-24 PROCEDURE — 86304 IMMUNOASSAY TUMOR CA 125: CPT | Performed by: NURSE PRACTITIONER

## 2020-11-30 ENCOUNTER — TELEPHONE (OUTPATIENT)
Dept: GYNECOLOGIC ONCOLOGY | Facility: CLINIC | Age: 50
End: 2020-11-30

## 2020-11-30 NOTE — TELEPHONE ENCOUNTER
I called patient and let her know results.    May have increased a little due to bowel obstruction.    Will check again at her next visit to revaluate trend.

## 2020-11-30 NOTE — TELEPHONE ENCOUNTER
----- Message from NELSON Stone sent at 11/30/2020  9:24 AM EST -----  Please notify CA-125 low/stable. Thanks!

## 2020-12-21 NOTE — PROGRESS NOTES
GYN ONCOLOGY CANCER SURVEILLANCE FOLLOW-UP    Gertrudis Yost  [id number]  1970    Chief Complaint: Follow-up (ER on 11/13/2020)        History of present illness:  Gertrudis Yost is a 50 y.o. year old female who is here today for ongoing surveillance of Ovarian Cancer, see Cancer History, and high risk visit. She is over 4 years out from completion of treatment.  CA-125 has been low/stable, due to repeat today.     Gertrudis has tested positive for CHEK2 mutation and is followed for high risk status as well. This has been estimated to increase the risk of female breast cancer to somewhere between 24-48% depending on family history.  There is also a possible increased risk for colorectal cancer in both men and women up to 9.5%. She last completed screening mammogram 6/18/2020. High risk breast MRI is UTD, last 7/19/2019, order to repeat needed today.  She has not yet completed colonoscopy due to high out-of-pocket cost for prep. She would be accepting of local referral to attempt again in the next few months.     Today's visit was actually scheduled earlier in the month, but they day of her visit she was in the ED in [facility] due to abdominal pain and vomiting. This occurred around the time of her last follow-up as well. CT imaging in ED on 11/13/2020 showed findings compatible with small bowel obstruction.  This is a repeat occurrence and was managed conservatively.  Medical team feels that this is likely related to abdominal wall hernias.  She has previously been evaluated by general surgery who says that she needs to quit smoking before hernias can be repaired.  Patient reports she has cut back to 1/2 pack/day and is very motivated to completely quit.  She is very desiring of hernia repair and this is a motivating factor for her.  In order to prevent recurrent bowel obstructions she has cut back her narcotic pain medications and is trying to increase water intake daily.          Cancer History:    Oncology/Hematology History   Ovarian cancer (CMS/HCC)   4/2016 Imaging    20 cm right pelvic mass seen upon CT during work-up for abdominal pain with leukocytosis and emesis. Referred to Gyn Oncology. CA-125 = 147 and CEA = 1.9     4/11/2016 Surgery    Laparotomy and debulking with Dr. Roman along with cholecystectomy by Dr. Leslie.   Cancer staging surgery included MORE/BSO, bilateral pelvic and periaortic lymph node dissection, omentectomy, appendectomy, and peritoneal stripping. There was intra-operative spill of the right ovarian mass during surgery. 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  Washings negative for malignant cells.  No LVSI, no ovarian surface involvement, omentum negative, and all sampled nodes negative.  Due to intraoperative rupture, final Stage ICI grade 2     5/2/2016 Surgery    Port-A-Cath placement       5/11/2016 - 8/31/2016 Chemotherapy    Carbo/Taxol every 3 weeks × 6 cycles     10/25/2016 Imaging    Posttreatment CT scan showed only a small node in the left external iliac chain not meeting criteria for malignant adenopathy, unchanged from preop scan.  Study otherwise negative.     9/7/2017 Surgery    Port-A-Cath removal       9/2017 Genetic Testing    Genetic testing was positive for CHEK2 gene (c.470T>C)         Past Medical History:   Diagnosis Date   • Abdominal pain    • Anxiety    • BMI 29.0-29.9,adult     BMI > = 18 - 29.6   • Chronic narcotic use     Chronic neck pain.  Dr. Mian Somers.   • Chronic neck pain    • Current every day smoker    • Dehydration    • Drug therapy 2016   • Emesis 8/25/2016   • GERD (gastroesophageal reflux disease)    • Leukocytosis 8/25/2016   • Ovarian cancer (CMS/HCC) 04/2016    Stage 1C1 mucinous adenocarcinoma       Past Surgical History:   Procedure Laterality Date   • APPENDECTOMY  04/2016   • CHOLECYSTECTOMY OPEN  04/2016   • MANDIBLE FRACTURE SURGERY      jaw broke and reset    • TOTAL ABDOMINAL HYSTERECTOMY WITH SALPINGO OOPHORECTOMY Bilateral 04/2016    Ovarian cancer debulking   • VENOUS ACCESS DEVICE (PORT) INSERTION Left 05/2016       MEDICATIONS: The current medication list was reviewed and reconciled.     Allergies:  has No Known Allergies.    Family History   Problem Relation Age of Onset   • Heart disease Mother    • Breast cancer Maternal Grandmother         DX AGE UNKNOWN   • BRCA 1/2 Neg Hx    • Colon cancer Neg Hx    • Endometrial cancer Neg Hx    • Ovarian cancer Neg Hx        Last imaging study was CT abdomen pelvis 11/13/2020 ([facility], outside facility)  Tumor marker:     Date Value Ref Range Status   05/13/2020 11.7 0.0 - 38.1 U/mL Final   09/16/2019 12.0 0.0 - 38.1 U/mL Final   01/30/2019 10.0 0.0 - 30.2 U/mL Final       Review of Systems   Constitutional: Negative for appetite change, chills, fatigue, fever and unexpected weight change.   Respiratory: Negative for cough, shortness of breath and wheezing.    Cardiovascular: Negative for chest pain, palpitations and leg swelling.   Gastrointestinal: Negative for abdominal distention, abdominal pain, blood in stool, constipation, diarrhea, nausea and vomiting.   Endocrine: Negative.    Genitourinary: Negative for dyspareunia, dysuria, frequency, genital sores, hematuria, pelvic pain, urgency, vaginal bleeding, vaginal discharge and vaginal pain.   Musculoskeletal: Negative for arthralgias, gait problem and joint swelling.   Neurological: Negative for dizziness, seizures, syncope, weakness, light-headedness, numbness and headaches.   Hematological: Negative for adenopathy.   Psychiatric/Behavioral: Negative.          Physical Exam  Vital Signs: /94   Pulse 63   Temp 98 °F (36.7 °C) (Temporal)   Resp 16   Wt 88 kg (194 lb)   SpO2 98%   BMI 31.31 kg/m²   Vitals:    11/24/20 1001   PainSc: 0-No pain           General Appearance:  alert, cooperative, no apparent distress and appears stated age    Neurologic/Psychiatric: A&O x 3, gait steady, appropriate affect   HEENT:  Normocephalic, without obvious abnormality, mucous membranes moist   Neck: Supple, symmetrical, trachea midline, no adenopathy;  No thyromegaly, masses, or tenderness   Back:   Symmetric, no curvature, ROM normal, no CVA tenderness   Lungs:   Clear to auscultation bilaterally; respirations regular, even, and unlabored bilaterally   Heart:  Regular rate and rhythm, no murmurs appreciated   Breasts:  Symmetrical, no masses, no lesions and no nipple discharge   Abdomen:   Soft, non-distended, no organomegaly and generalized mild tenderness. No rebound or guarding   Lymph nodes: No cervical, supraclavicular, inguinal adenopathy noted   Extremities: Normal, atraumatic; no clubbing, cyanosis, or edema    Pelvic: External Genitalia  without lesions or skin changes  Vagina  is pink, moist, without lesions.   Vaginal Cuff  Female Vaginal Cuff: smooth, intact and without visible lesions  Uterus  surgically absent  Ovaries  surgically absent bilaterally and without palpable masses or fullness  Parametria  smooth  Rectovaginal  Female rectovaginal: deferred     ECOG Performance Status: (0) Fully Active - Able to Carry On All Pre-disease Performance Without Restriction    Procedure Note:  No notes on file      Assessment and Plan:  Diagnoses and all orders for this visit:    1. Malignant neoplasm of right ovary (CMS/HCC) (Primary)  -         2. At high risk for breast cancer    3. 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 H/O small bowel obstruction    5. Screening for colon cancer  -     MRI Breast Bilateral Screening With & Without Contrast; Future  -     Ambulatory Referral For Screening Colonoscopy    6. Current every day smoker          There is no evidence of disease upon today's exam. She will be  notified of CA-125 results upon their return. Outside facility CT report reviewed, no evidence of disease.  Continue every 6 month visits for cancer surveillance until the 5 year amarilis.     Reviewed screening NCCN recommended screening schedule for CHEK2+ as follows:  Monthly breast exams starting at age 18.   Clinical breast exam every 6 months.  Annual mammogram starting at age 40, or earlier based upon family history.  Annual breast MRI starting at age 40.  Consider chemoprevention for breast cancer risk reduction (Tamoxifen or Raloxifene).  Consider risk-reducing mastectomy.  Colonoscopies every 5 years starting at age 40, or earlier based upon family history.     Discussed the purpose of high risk clinic in coordinating, scheduling, and planning screening interventions to include yearly mammograms, yearly screening breast MRI, Q6 month CBE, and monthly SBE. Colonoscopy every 5 years is recommended. GYN screening per national guidelines.   Reviewed technique for SBE including concerning findings to report.  Repeat mammogram in 6/2021.   Discussed tamoxifen/evista use for chemoprevention including risk and benefits of use as well as side effects of medication-pt declines at this time  Discussed screening breast MRI scheduling based on menstrual cycles, HRT. We also discussed the increased sensitivity of MRI screening and possibility of call-backs for additional imaging or biopsies to establish baseline. Repeat MRI due now, order placed.   Discussed vitamin D as benefit to bone health and possible benefit to breast health. Recommended 1000 IU daily unless contraindicated  Discussed benefit of Vitamin E for fibrocystic breasts/breast pain.  Patient has not yet completed initial colonoscopy due to out-of-pocket cost. Referral to local GI for her screening study ordered now.     Patient and I discussed her recurrent symptoms of small bowel obstructions, possibly related to known abdominal wall hernias.  She has been  evaluated by general surgery to consider repair it is motivated to pursue this.  However, she needs to achieve smoking cessation prior to that time. I advised the patient of the risks in continuing to use tobacco, and I provided this patient with smoking cessation educational materials.  I also discussed how to quit smoking and the patient has expressed the willingness to quit.    During this visit, I spent > 3-10 minutes counseling the patient regarding smoking cessation.       Pain assessment was performed today as a part of patient’s care.  For patients with pain related to surgery, gynecologic malignancy or cancer treatment, the plan is as noted in the assessment/plan.  For patients with pain not related to these issues, they are to seek any further needed care from a more appropriate provider, such as PCP.      Return to clinic in 6 months for ongoing cancer surveillance and High Risk visit.      NELSON Stone

## 2021-02-12 ENCOUNTER — HOSPITAL ENCOUNTER (OUTPATIENT)
Dept: MRI IMAGING | Facility: HOSPITAL | Age: 51
Discharge: HOME | End: 2021-02-12
Admitting: NURSE PRACTITIONER

## 2021-02-12 DIAGNOSIS — Z15.09 MONOALLELIC MUTATION OF CHEK2 GENE IN FEMALE PATIENT: ICD-10-CM

## 2021-02-12 DIAGNOSIS — Z15.02 MONOALLELIC MUTATION OF CHEK2 GENE IN FEMALE PATIENT: ICD-10-CM

## 2021-02-12 DIAGNOSIS — Z12.11 SCREENING FOR COLON CANCER: ICD-10-CM

## 2021-02-12 DIAGNOSIS — Z15.89 MONOALLELIC MUTATION OF CHEK2 GENE IN FEMALE PATIENT: ICD-10-CM

## 2021-02-12 DIAGNOSIS — Z15.01 MONOALLELIC MUTATION OF CHEK2 GENE IN FEMALE PATIENT: ICD-10-CM

## 2021-02-12 PROCEDURE — 0 GADOBENATE DIMEGLUMINE 529 MG/ML SOLUTION: Performed by: NURSE PRACTITIONER

## 2021-02-12 PROCEDURE — A9577 INJ MULTIHANCE: HCPCS | Performed by: NURSE PRACTITIONER

## 2021-02-12 PROCEDURE — C8937 CAD BREAST MRI: HCPCS

## 2021-02-12 PROCEDURE — 77049 MRI BREAST C-+ W/CAD BI: CPT

## 2021-02-12 RX ADMIN — GADOBENATE DIMEGLUMINE 17 ML: 529 INJECTION, SOLUTION INTRAVENOUS at 10:41

## 2021-03-08 NOTE — TELEPHONE ENCOUNTER
Patient called and states she needs something for nausea. She states she has never been sick like this before. This is before her high risk breast MRI.         I told her we could send her in a 8 mg zofran to take about an hour prior to the procedure she verbalized understanding.

## 2021-03-09 ENCOUNTER — HOSPITAL ENCOUNTER (OUTPATIENT)
Dept: MRI IMAGING | Facility: HOSPITAL | Age: 51
Discharge: HOME | End: 2021-03-09
Admitting: NURSE PRACTITIONER

## 2021-03-09 DIAGNOSIS — Z80.3 FAMILY HISTORY OF MALIGNANT NEOPLASM OF BREAST: ICD-10-CM

## 2021-03-09 PROCEDURE — A9577 INJ MULTIHANCE: HCPCS | Performed by: NURSE PRACTITIONER

## 2021-03-09 PROCEDURE — C8937 CAD BREAST MRI: HCPCS

## 2021-03-09 PROCEDURE — 77049 MRI BREAST C-+ W/CAD BI: CPT | Performed by: RADIOLOGY

## 2021-03-09 PROCEDURE — 77049 MRI BREAST C-+ W/CAD BI: CPT

## 2021-03-09 PROCEDURE — 0 GADOBENATE DIMEGLUMINE 529 MG/ML SOLUTION: Performed by: NURSE PRACTITIONER

## 2021-03-09 RX ORDER — ONDANSETRON HYDROCHLORIDE 8 MG/1
TABLET, FILM COATED ORAL
Qty: 5 TABLET | Refills: 0 | OUTPATIENT
Start: 2021-03-09 | End: 2021-05-14

## 2021-03-09 RX ADMIN — GADOBENATE DIMEGLUMINE 17 ML: 529 INJECTION, SOLUTION INTRAVENOUS at 16:11

## 2021-03-15 ENCOUNTER — TELEPHONE (OUTPATIENT)
Dept: MRI IMAGING | Facility: HOSPITAL | Age: 51
End: 2021-03-15

## 2021-04-17 ENCOUNTER — HOSPITAL ENCOUNTER (OUTPATIENT)
Facility: HOSPITAL | Age: 51
Discharge: HOME | End: 2021-04-18
Attending: EMERGENCY MEDICINE | Admitting: SURGERY

## 2021-04-17 ENCOUNTER — APPOINTMENT (OUTPATIENT)
Dept: GENERAL RADIOLOGY | Facility: HOSPITAL | Age: 51
End: 2021-04-17

## 2021-04-17 ENCOUNTER — ANESTHESIA EVENT (OUTPATIENT)
Dept: PERIOP | Facility: HOSPITAL | Age: 51
End: 2021-04-17

## 2021-04-17 ENCOUNTER — APPOINTMENT (OUTPATIENT)
Dept: CT IMAGING | Facility: HOSPITAL | Age: 51
End: 2021-04-17

## 2021-04-17 DIAGNOSIS — K42.0 INCARCERATED UMBILICAL HERNIA: Primary | ICD-10-CM

## 2021-04-17 DIAGNOSIS — D72.829 LEUKOCYTOSIS, UNSPECIFIED TYPE: ICD-10-CM

## 2021-04-17 DIAGNOSIS — K43.2 INCISIONAL HERNIA: ICD-10-CM

## 2021-04-17 LAB
ALBUMIN SERPL-MCNC: 4.7 G/DL (ref 3.5–5.2)
ALBUMIN/GLOB SERPL: 1.7 G/DL
ALP SERPL-CCNC: 96 U/L (ref 39–117)
ALT SERPL W P-5'-P-CCNC: 19 U/L (ref 1–33)
ANION GAP SERPL CALCULATED.3IONS-SCNC: 9 MMOL/L (ref 5–15)
AST SERPL-CCNC: 17 U/L (ref 1–32)
BASOPHILS # BLD AUTO: 0.03 10*3/MM3 (ref 0–0.2)
BASOPHILS NFR BLD AUTO: 0.2 % (ref 0–1.5)
BILIRUB SERPL-MCNC: 0.6 MG/DL (ref 0–1.2)
BILIRUB UR QL STRIP: NEGATIVE
BUN SERPL-MCNC: 14 MG/DL (ref 6–20)
BUN/CREAT SERPL: 22.6 (ref 7–25)
CALCIUM SPEC-SCNC: 9.7 MG/DL (ref 8.6–10.5)
CHLORIDE SERPL-SCNC: 104 MMOL/L (ref 98–107)
CLARITY UR: CLEAR
CO2 SERPL-SCNC: 28 MMOL/L (ref 22–29)
COLOR UR: YELLOW
CREAT SERPL-MCNC: 0.62 MG/DL (ref 0.57–1)
D-LACTATE SERPL-SCNC: 1.3 MMOL/L (ref 0.5–2)
DEPRECATED RDW RBC AUTO: 45.2 FL (ref 37–54)
EOSINOPHIL # BLD AUTO: 0.05 10*3/MM3 (ref 0–0.4)
EOSINOPHIL NFR BLD AUTO: 0.3 % (ref 0.3–6.2)
ERYTHROCYTE [DISTWIDTH] IN BLOOD BY AUTOMATED COUNT: 13.5 % (ref 12.3–15.4)
GFR SERPL CREATININE-BSD FRML MDRD: 102 ML/MIN/1.73
GLOBULIN UR ELPH-MCNC: 2.8 GM/DL
GLUCOSE SERPL-MCNC: 113 MG/DL (ref 65–99)
GLUCOSE UR STRIP-MCNC: NEGATIVE MG/DL
HCT VFR BLD AUTO: 42.7 % (ref 34–46.6)
HGB BLD-MCNC: 13.7 G/DL (ref 12–15.9)
HGB UR QL STRIP.AUTO: NEGATIVE
HOLD SPECIMEN: NORMAL
HOLD SPECIMEN: NORMAL
IMM GRANULOCYTES # BLD AUTO: 0.1 10*3/MM3 (ref 0–0.05)
IMM GRANULOCYTES NFR BLD AUTO: 0.7 % (ref 0–0.5)
KETONES UR QL STRIP: NEGATIVE
LEUKOCYTE ESTERASE UR QL STRIP.AUTO: NEGATIVE
LIPASE SERPL-CCNC: 18 U/L (ref 13–60)
LYMPHOCYTES # BLD AUTO: 2.62 10*3/MM3 (ref 0.7–3.1)
LYMPHOCYTES NFR BLD AUTO: 18.2 % (ref 19.6–45.3)
MCH RBC QN AUTO: 29.3 PG (ref 26.6–33)
MCHC RBC AUTO-ENTMCNC: 32.1 G/DL (ref 31.5–35.7)
MCV RBC AUTO: 91.4 FL (ref 79–97)
MONOCYTES # BLD AUTO: 0.86 10*3/MM3 (ref 0.1–0.9)
MONOCYTES NFR BLD AUTO: 6 % (ref 5–12)
NEUTROPHILS NFR BLD AUTO: 10.71 10*3/MM3 (ref 1.7–7)
NEUTROPHILS NFR BLD AUTO: 74.6 % (ref 42.7–76)
NITRITE UR QL STRIP: NEGATIVE
NRBC BLD AUTO-RTO: 0 /100 WBC (ref 0–0.2)
PH UR STRIP.AUTO: 6.5 [PH] (ref 5–8)
PLATELET # BLD AUTO: 246 10*3/MM3 (ref 140–450)
PMV BLD AUTO: 11 FL (ref 6–12)
POTASSIUM SERPL-SCNC: 4.4 MMOL/L (ref 3.5–5.2)
PROT SERPL-MCNC: 7.5 G/DL (ref 6–8.5)
PROT UR QL STRIP: NEGATIVE
RBC # BLD AUTO: 4.67 10*6/MM3 (ref 3.77–5.28)
SODIUM SERPL-SCNC: 141 MMOL/L (ref 136–145)
SP GR UR STRIP: 1.02 (ref 1–1.03)
TROPONIN T SERPL-MCNC: <0.01 NG/ML (ref 0–0.03)
UROBILINOGEN UR QL STRIP: NORMAL
WBC # BLD AUTO: 14.37 10*3/MM3 (ref 3.4–10.8)
WHOLE BLOOD HOLD SPECIMEN: NORMAL
WHOLE BLOOD HOLD SPECIMEN: NORMAL

## 2021-04-17 PROCEDURE — 74177 CT ABD & PELVIS W/CONTRAST: CPT

## 2021-04-17 PROCEDURE — 25010000002 IOPAMIDOL 61 % SOLUTION: Performed by: EMERGENCY MEDICINE

## 2021-04-17 PROCEDURE — 83690 ASSAY OF LIPASE: CPT | Performed by: EMERGENCY MEDICINE

## 2021-04-17 PROCEDURE — 85025 COMPLETE CBC W/AUTO DIFF WBC: CPT | Performed by: EMERGENCY MEDICINE

## 2021-04-17 PROCEDURE — 93005 ELECTROCARDIOGRAM TRACING: CPT | Performed by: EMERGENCY MEDICINE

## 2021-04-17 PROCEDURE — 84484 ASSAY OF TROPONIN QUANT: CPT | Performed by: EMERGENCY MEDICINE

## 2021-04-17 PROCEDURE — U0003 INFECTIOUS AGENT DETECTION BY NUCLEIC ACID (DNA OR RNA); SEVERE ACUTE RESPIRATORY SYNDROME CORONAVIRUS 2 (SARS-COV-2) (CORONAVIRUS DISEASE [COVID-19]), AMPLIFIED PROBE TECHNIQUE, MAKING USE OF HIGH THROUGHPUT TECHNOLOGIES AS DESCRIBED BY CMS-2020-01-R: HCPCS | Performed by: EMERGENCY MEDICINE

## 2021-04-17 PROCEDURE — 71045 X-RAY EXAM CHEST 1 VIEW: CPT

## 2021-04-17 PROCEDURE — C9803 HOPD COVID-19 SPEC COLLECT: HCPCS

## 2021-04-17 PROCEDURE — 96374 THER/PROPH/DIAG INJ IV PUSH: CPT

## 2021-04-17 PROCEDURE — 83605 ASSAY OF LACTIC ACID: CPT | Performed by: EMERGENCY MEDICINE

## 2021-04-17 PROCEDURE — 99284 EMERGENCY DEPT VISIT MOD MDM: CPT

## 2021-04-17 PROCEDURE — 25010000002 ONDANSETRON PER 1 MG: Performed by: EMERGENCY MEDICINE

## 2021-04-17 PROCEDURE — 87635 SARS-COV-2 COVID-19 AMP PRB: CPT | Performed by: EMERGENCY MEDICINE

## 2021-04-17 PROCEDURE — 81003 URINALYSIS AUTO W/O SCOPE: CPT | Performed by: EMERGENCY MEDICINE

## 2021-04-17 PROCEDURE — 80053 COMPREHEN METABOLIC PANEL: CPT | Performed by: EMERGENCY MEDICINE

## 2021-04-17 PROCEDURE — 93005 ELECTROCARDIOGRAM TRACING: CPT

## 2021-04-17 RX ORDER — HYDROMORPHONE HYDROCHLORIDE 1 MG/ML
0.5 INJECTION, SOLUTION INTRAMUSCULAR; INTRAVENOUS; SUBCUTANEOUS ONCE
Status: DISCONTINUED | OUTPATIENT
Start: 2021-04-17 | End: 2021-04-18 | Stop reason: HOSPADM

## 2021-04-17 RX ORDER — ONDANSETRON 2 MG/ML
4 INJECTION INTRAMUSCULAR; INTRAVENOUS ONCE
Status: COMPLETED | OUTPATIENT
Start: 2021-04-17 | End: 2021-04-17

## 2021-04-17 RX ORDER — CEFAZOLIN SODIUM 2 G/100ML
2 INJECTION, SOLUTION INTRAVENOUS ONCE
Status: COMPLETED | OUTPATIENT
Start: 2021-04-17 | End: 2021-04-18

## 2021-04-17 RX ORDER — SODIUM CHLORIDE 0.9 % (FLUSH) 0.9 %
10 SYRINGE (ML) INJECTION AS NEEDED
Status: DISCONTINUED | OUTPATIENT
Start: 2021-04-17 | End: 2021-04-18 | Stop reason: HOSPADM

## 2021-04-17 RX ADMIN — IOPAMIDOL 80 ML: 612 INJECTION, SOLUTION INTRAVENOUS at 22:16

## 2021-04-17 RX ADMIN — ONDANSETRON 4 MG: 2 INJECTION INTRAMUSCULAR; INTRAVENOUS at 21:46

## 2021-04-17 RX ADMIN — SODIUM CHLORIDE 500 ML: 9 INJECTION, SOLUTION INTRAVENOUS at 21:46

## 2021-04-18 ENCOUNTER — ANESTHESIA (OUTPATIENT)
Dept: PERIOP | Facility: HOSPITAL | Age: 51
End: 2021-04-18

## 2021-04-18 ENCOUNTER — ANESTHESIA EVENT CONVERTED (OUTPATIENT)
Dept: ANESTHESIOLOGY | Facility: HOSPITAL | Age: 51
End: 2021-04-18

## 2021-04-18 VITALS
SYSTOLIC BLOOD PRESSURE: 105 MMHG | BODY MASS INDEX: 33.8 KG/M2 | WEIGHT: 210.3 LBS | HEIGHT: 66 IN | OXYGEN SATURATION: 93 % | RESPIRATION RATE: 18 BRPM | TEMPERATURE: 97.8 F | HEART RATE: 76 BPM | DIASTOLIC BLOOD PRESSURE: 70 MMHG

## 2021-04-18 PROBLEM — K43.0 INCISIONAL HERNIA WITH OBSTRUCTION BUT NO GANGRENE: Status: ACTIVE | Noted: 2021-04-18

## 2021-04-18 PROBLEM — K42.0 INCARCERATED UMBILICAL HERNIA: Status: ACTIVE | Noted: 2021-04-18

## 2021-04-18 LAB
ANION GAP SERPL CALCULATED.3IONS-SCNC: 9 MMOL/L (ref 5–15)
BASOPHILS # BLD AUTO: 0.02 10*3/MM3 (ref 0–0.2)
BASOPHILS NFR BLD AUTO: 0.2 % (ref 0–1.5)
BUN SERPL-MCNC: 13 MG/DL (ref 6–20)
BUN/CREAT SERPL: 16.5 (ref 7–25)
CALCIUM SPEC-SCNC: 9.3 MG/DL (ref 8.6–10.5)
CHLORIDE SERPL-SCNC: 103 MMOL/L (ref 98–107)
CO2 SERPL-SCNC: 24 MMOL/L (ref 22–29)
CREAT SERPL-MCNC: 0.79 MG/DL (ref 0.57–1)
DEPRECATED RDW RBC AUTO: 45.5 FL (ref 37–54)
EOSINOPHIL # BLD AUTO: 0.01 10*3/MM3 (ref 0–0.4)
EOSINOPHIL NFR BLD AUTO: 0.1 % (ref 0.3–6.2)
ERYTHROCYTE [DISTWIDTH] IN BLOOD BY AUTOMATED COUNT: 13.6 % (ref 12.3–15.4)
GFR SERPL CREATININE-BSD FRML MDRD: 77 ML/MIN/1.73
GLUCOSE SERPL-MCNC: 127 MG/DL (ref 65–99)
HCT VFR BLD AUTO: 40 % (ref 34–46.6)
HGB BLD-MCNC: 13.2 G/DL (ref 12–15.9)
IMM GRANULOCYTES # BLD AUTO: 0.09 10*3/MM3 (ref 0–0.05)
IMM GRANULOCYTES NFR BLD AUTO: 0.7 % (ref 0–0.5)
LYMPHOCYTES # BLD AUTO: 1.39 10*3/MM3 (ref 0.7–3.1)
LYMPHOCYTES NFR BLD AUTO: 10.7 % (ref 19.6–45.3)
MCH RBC QN AUTO: 29.9 PG (ref 26.6–33)
MCHC RBC AUTO-ENTMCNC: 33 G/DL (ref 31.5–35.7)
MCV RBC AUTO: 90.5 FL (ref 79–97)
MONOCYTES # BLD AUTO: 0.2 10*3/MM3 (ref 0.1–0.9)
MONOCYTES NFR BLD AUTO: 1.5 % (ref 5–12)
NEUTROPHILS NFR BLD AUTO: 11.29 10*3/MM3 (ref 1.7–7)
NEUTROPHILS NFR BLD AUTO: 86.8 % (ref 42.7–76)
NRBC BLD AUTO-RTO: 0 /100 WBC (ref 0–0.2)
PLATELET # BLD AUTO: 217 10*3/MM3 (ref 140–450)
PMV BLD AUTO: 10.9 FL (ref 6–12)
POTASSIUM SERPL-SCNC: 4.7 MMOL/L (ref 3.5–5.2)
QT INTERVAL: 398 MS
QTC INTERVAL: 423 MS
RBC # BLD AUTO: 4.42 10*6/MM3 (ref 3.77–5.28)
SARS-COV-2 RDRP RESP QL NAA+PROBE: NORMAL
SARS-COV-2 RNA RESP QL NAA+PROBE: NOT DETECTED
SODIUM SERPL-SCNC: 136 MMOL/L (ref 136–145)
WBC # BLD AUTO: 13 10*3/MM3 (ref 3.4–10.8)

## 2021-04-18 PROCEDURE — 85025 COMPLETE CBC W/AUTO DIFF WBC: CPT | Performed by: SURGERY

## 2021-04-18 PROCEDURE — C1781 MESH (IMPLANTABLE): HCPCS | Performed by: SURGERY

## 2021-04-18 PROCEDURE — 25010000002 SUCCINYLCHOLINE PER 20 MG: Performed by: ANESTHESIOLOGY

## 2021-04-18 PROCEDURE — G0378 HOSPITAL OBSERVATION PER HR: HCPCS

## 2021-04-18 PROCEDURE — 88302 TISSUE EXAM BY PATHOLOGIST: CPT | Performed by: SURGERY

## 2021-04-18 PROCEDURE — 25010000002 BUPRENORPHINE PER 0.1 MG: Performed by: ANESTHESIOLOGY

## 2021-04-18 PROCEDURE — 25010000003 CEFAZOLIN IN DEXTROSE 2-4 GM/100ML-% SOLUTION: Performed by: SURGERY

## 2021-04-18 PROCEDURE — 25010000002 FENTANYL CITRATE (PF) 100 MCG/2ML SOLUTION: Performed by: ANESTHESIOLOGY

## 2021-04-18 PROCEDURE — 94799 UNLISTED PULMONARY SVC/PX: CPT

## 2021-04-18 PROCEDURE — 96375 TX/PRO/DX INJ NEW DRUG ADDON: CPT

## 2021-04-18 PROCEDURE — 49568 PR IMPLANT MESH HERNIA REPAIR/DEBRIDEMENT CLOSURE: CPT | Performed by: PHYSICIAN ASSISTANT

## 2021-04-18 PROCEDURE — 25010000002 METOCLOPRAMIDE PER 10 MG: Performed by: SURGERY

## 2021-04-18 PROCEDURE — 49560 PR REPAIR INCISIONAL HERNIA,REDUCIBLE: CPT | Performed by: PHYSICIAN ASSISTANT

## 2021-04-18 PROCEDURE — 80048 BASIC METABOLIC PNL TOTAL CA: CPT | Performed by: SURGERY

## 2021-04-18 PROCEDURE — 25010000002 NEOSTIGMINE 10 MG/10ML SOLUTION: Performed by: ANESTHESIOLOGY

## 2021-04-18 PROCEDURE — 25010000002 PROPOFOL 10 MG/ML EMULSION: Performed by: ANESTHESIOLOGY

## 2021-04-18 PROCEDURE — 25010000002 DEXAMETHASONE SODIUM PHOSPHATE 10 MG/ML SOLUTION: Performed by: ANESTHESIOLOGY

## 2021-04-18 PROCEDURE — 25010000002 DEXAMETHASONE PER 1 MG: Performed by: ANESTHESIOLOGY

## 2021-04-18 PROCEDURE — 25010000002 ONDANSETRON PER 1 MG: Performed by: ANESTHESIOLOGY

## 2021-04-18 DEVICE — VENTRIO ST HERNIA PATCH
Type: IMPLANTABLE DEVICE | Site: ABDOMEN | Status: FUNCTIONAL
Brand: VENTRIO ST HERNIA PATCH

## 2021-04-18 RX ORDER — FENTANYL CITRATE 50 UG/ML
50 INJECTION, SOLUTION INTRAMUSCULAR; INTRAVENOUS
Status: DISCONTINUED | OUTPATIENT
Start: 2021-04-18 | End: 2021-04-18 | Stop reason: HOSPADM

## 2021-04-18 RX ORDER — SODIUM CHLORIDE 0.9 % (FLUSH) 0.9 %
10 SYRINGE (ML) INJECTION EVERY 12 HOURS SCHEDULED
Status: CANCELLED | OUTPATIENT
Start: 2021-04-18

## 2021-04-18 RX ORDER — MIDAZOLAM HYDROCHLORIDE 1 MG/ML
2 INJECTION INTRAMUSCULAR; INTRAVENOUS
Status: CANCELLED | OUTPATIENT
Start: 2021-04-18

## 2021-04-18 RX ORDER — PROMETHAZINE HYDROCHLORIDE 25 MG/1
25 SUPPOSITORY RECTAL ONCE AS NEEDED
Status: DISCONTINUED | OUTPATIENT
Start: 2021-04-18 | End: 2021-04-18 | Stop reason: HOSPADM

## 2021-04-18 RX ORDER — METOCLOPRAMIDE HYDROCHLORIDE 5 MG/ML
10 INJECTION INTRAMUSCULAR; INTRAVENOUS EVERY 6 HOURS
Status: DISCONTINUED | OUTPATIENT
Start: 2021-04-18 | End: 2021-04-18 | Stop reason: HOSPADM

## 2021-04-18 RX ORDER — NEOSTIGMINE METHYLSULFATE 1 MG/ML
INJECTION, SOLUTION INTRAVENOUS AS NEEDED
Status: DISCONTINUED | OUTPATIENT
Start: 2021-04-18 | End: 2021-04-18 | Stop reason: SURG

## 2021-04-18 RX ORDER — PANTOPRAZOLE SODIUM 40 MG/1
40 TABLET, DELAYED RELEASE ORAL
Status: DISCONTINUED | OUTPATIENT
Start: 2021-04-18 | End: 2021-04-18 | Stop reason: HOSPADM

## 2021-04-18 RX ORDER — SIMETHICONE 80 MG
80 TABLET,CHEWABLE ORAL 4 TIMES DAILY PRN
Status: DISCONTINUED | OUTPATIENT
Start: 2021-04-18 | End: 2021-04-18 | Stop reason: HOSPADM

## 2021-04-18 RX ORDER — HYDROMORPHONE HYDROCHLORIDE 1 MG/ML
0.2 INJECTION, SOLUTION INTRAMUSCULAR; INTRAVENOUS; SUBCUTANEOUS
Status: DISCONTINUED | OUTPATIENT
Start: 2021-04-18 | End: 2021-04-18 | Stop reason: HOSPADM

## 2021-04-18 RX ORDER — MAGNESIUM HYDROXIDE 1200 MG/15ML
LIQUID ORAL AS NEEDED
Status: DISCONTINUED | OUTPATIENT
Start: 2021-04-18 | End: 2021-04-18 | Stop reason: HOSPADM

## 2021-04-18 RX ORDER — OXYCODONE AND ACETAMINOPHEN 10; 325 MG/1; MG/1
2 TABLET ORAL EVERY 6 HOURS PRN
Status: DISCONTINUED | OUTPATIENT
Start: 2021-04-18 | End: 2021-04-18 | Stop reason: HOSPADM

## 2021-04-18 RX ORDER — LIDOCAINE HYDROCHLORIDE 10 MG/ML
0.5 INJECTION, SOLUTION EPIDURAL; INFILTRATION; INTRACAUDAL; PERINEURAL ONCE AS NEEDED
Status: CANCELLED | OUTPATIENT
Start: 2021-04-18

## 2021-04-18 RX ORDER — PROPOFOL 10 MG/ML
VIAL (ML) INTRAVENOUS AS NEEDED
Status: DISCONTINUED | OUTPATIENT
Start: 2021-04-18 | End: 2021-04-18 | Stop reason: SURG

## 2021-04-18 RX ORDER — PROMETHAZINE HYDROCHLORIDE 25 MG/1
25 TABLET ORAL ONCE AS NEEDED
Status: DISCONTINUED | OUTPATIENT
Start: 2021-04-18 | End: 2021-04-18 | Stop reason: HOSPADM

## 2021-04-18 RX ORDER — PSEUDOEPHEDRINE HCL 30 MG
100 TABLET ORAL 2 TIMES DAILY
Qty: 20 CAPSULE | Refills: 0 | Status: SHIPPED | OUTPATIENT
Start: 2021-04-18 | End: 2021-05-14

## 2021-04-18 RX ORDER — SUCCINYLCHOLINE CHLORIDE 20 MG/ML
INJECTION INTRAMUSCULAR; INTRAVENOUS AS NEEDED
Status: DISCONTINUED | OUTPATIENT
Start: 2021-04-18 | End: 2021-04-18 | Stop reason: SURG

## 2021-04-18 RX ORDER — FAMOTIDINE 10 MG/ML
20 INJECTION, SOLUTION INTRAVENOUS ONCE
Status: COMPLETED | OUTPATIENT
Start: 2021-04-18 | End: 2021-04-18

## 2021-04-18 RX ORDER — SODIUM CHLORIDE, SODIUM LACTATE, POTASSIUM CHLORIDE, CALCIUM CHLORIDE 600; 310; 30; 20 MG/100ML; MG/100ML; MG/100ML; MG/100ML
150 INJECTION, SOLUTION INTRAVENOUS CONTINUOUS
Status: DISCONTINUED | OUTPATIENT
Start: 2021-04-18 | End: 2021-04-18 | Stop reason: HOSPADM

## 2021-04-18 RX ORDER — HEPARIN SODIUM 5000 [USP'U]/ML
5000 INJECTION, SOLUTION INTRAVENOUS; SUBCUTANEOUS EVERY 8 HOURS SCHEDULED
Status: DISCONTINUED | OUTPATIENT
Start: 2021-04-19 | End: 2021-04-18 | Stop reason: HOSPADM

## 2021-04-18 RX ORDER — METHOCARBAMOL 500 MG/1
500 TABLET, FILM COATED ORAL EVERY 8 HOURS SCHEDULED
Status: DISCONTINUED | OUTPATIENT
Start: 2021-04-18 | End: 2021-04-18 | Stop reason: HOSPADM

## 2021-04-18 RX ORDER — BUPIVACAINE HYDROCHLORIDE 2.5 MG/ML
INJECTION, SOLUTION EPIDURAL; INFILTRATION; INTRACAUDAL
Status: COMPLETED | OUTPATIENT
Start: 2021-04-18 | End: 2021-04-18

## 2021-04-18 RX ORDER — MORPHINE SULFATE 2 MG/ML
2 INJECTION, SOLUTION INTRAMUSCULAR; INTRAVENOUS
Status: DISCONTINUED | OUTPATIENT
Start: 2021-04-18 | End: 2021-04-18 | Stop reason: HOSPADM

## 2021-04-18 RX ORDER — DEXAMETHASONE SODIUM PHOSPHATE 4 MG/ML
INJECTION, SOLUTION INTRA-ARTICULAR; INTRALESIONAL; INTRAMUSCULAR; INTRAVENOUS; SOFT TISSUE AS NEEDED
Status: DISCONTINUED | OUTPATIENT
Start: 2021-04-18 | End: 2021-04-18 | Stop reason: SURG

## 2021-04-18 RX ORDER — GABAPENTIN 400 MG/1
400 CAPSULE ORAL EVERY 8 HOURS SCHEDULED
Status: DISCONTINUED | OUTPATIENT
Start: 2021-04-18 | End: 2021-04-18 | Stop reason: HOSPADM

## 2021-04-18 RX ORDER — ONDANSETRON 2 MG/ML
INJECTION INTRAMUSCULAR; INTRAVENOUS AS NEEDED
Status: DISCONTINUED | OUTPATIENT
Start: 2021-04-18 | End: 2021-04-18 | Stop reason: SURG

## 2021-04-18 RX ORDER — HYDROMORPHONE HYDROCHLORIDE 1 MG/ML
0.5 INJECTION, SOLUTION INTRAMUSCULAR; INTRAVENOUS; SUBCUTANEOUS
Status: DISCONTINUED | OUTPATIENT
Start: 2021-04-18 | End: 2021-04-18 | Stop reason: HOSPADM

## 2021-04-18 RX ORDER — FENTANYL CITRATE 50 UG/ML
INJECTION, SOLUTION INTRAMUSCULAR; INTRAVENOUS AS NEEDED
Status: DISCONTINUED | OUTPATIENT
Start: 2021-04-18 | End: 2021-04-18 | Stop reason: SURG

## 2021-04-18 RX ORDER — OXYCODONE HYDROCHLORIDE AND ACETAMINOPHEN 5; 325 MG/1; MG/1
2 TABLET ORAL EVERY 4 HOURS PRN
Status: DISCONTINUED | OUTPATIENT
Start: 2021-04-18 | End: 2021-04-18 | Stop reason: HOSPADM

## 2021-04-18 RX ORDER — ONDANSETRON 2 MG/ML
4 INJECTION INTRAMUSCULAR; INTRAVENOUS ONCE AS NEEDED
Status: DISCONTINUED | OUTPATIENT
Start: 2021-04-18 | End: 2021-04-18 | Stop reason: HOSPADM

## 2021-04-18 RX ORDER — NALOXONE HCL 0.4 MG/ML
0.4 VIAL (ML) INJECTION
Status: DISCONTINUED | OUTPATIENT
Start: 2021-04-18 | End: 2021-04-18 | Stop reason: HOSPADM

## 2021-04-18 RX ORDER — BISACODYL 5 MG/1
10 TABLET, DELAYED RELEASE ORAL DAILY
Status: DISCONTINUED | OUTPATIENT
Start: 2021-04-18 | End: 2021-04-18 | Stop reason: HOSPADM

## 2021-04-18 RX ORDER — ACETAMINOPHEN 325 MG/1
650 TABLET ORAL EVERY 4 HOURS PRN
Status: DISCONTINUED | OUTPATIENT
Start: 2021-04-18 | End: 2021-04-18 | Stop reason: HOSPADM

## 2021-04-18 RX ORDER — FAMOTIDINE 20 MG/1
20 TABLET, FILM COATED ORAL ONCE
Status: CANCELLED | OUTPATIENT
Start: 2021-04-18 | End: 2021-04-18

## 2021-04-18 RX ORDER — ROCURONIUM BROMIDE 10 MG/ML
INJECTION, SOLUTION INTRAVENOUS AS NEEDED
Status: DISCONTINUED | OUTPATIENT
Start: 2021-04-18 | End: 2021-04-18 | Stop reason: SURG

## 2021-04-18 RX ORDER — ONDANSETRON 4 MG/1
8 TABLET, FILM COATED ORAL EVERY 6 HOURS PRN
Status: DISCONTINUED | OUTPATIENT
Start: 2021-04-18 | End: 2021-04-18 | Stop reason: HOSPADM

## 2021-04-18 RX ORDER — GLYCOPYRROLATE 0.2 MG/ML
INJECTION INTRAMUSCULAR; INTRAVENOUS AS NEEDED
Status: DISCONTINUED | OUTPATIENT
Start: 2021-04-18 | End: 2021-04-18 | Stop reason: SURG

## 2021-04-18 RX ORDER — MIDAZOLAM HYDROCHLORIDE 1 MG/ML
1 INJECTION INTRAMUSCULAR; INTRAVENOUS
Status: CANCELLED | OUTPATIENT
Start: 2021-04-18

## 2021-04-18 RX ORDER — SODIUM CHLORIDE 0.9 % (FLUSH) 0.9 %
10 SYRINGE (ML) INJECTION AS NEEDED
Status: CANCELLED | OUTPATIENT
Start: 2021-04-18

## 2021-04-18 RX ORDER — ACETAMINOPHEN 650 MG/1
650 SUPPOSITORY RECTAL EVERY 4 HOURS PRN
Status: DISCONTINUED | OUTPATIENT
Start: 2021-04-18 | End: 2021-04-18 | Stop reason: HOSPADM

## 2021-04-18 RX ORDER — DEXAMETHASONE SODIUM PHOSPHATE 10 MG/ML
INJECTION, SOLUTION INTRAMUSCULAR; INTRAVENOUS
Status: COMPLETED | OUTPATIENT
Start: 2021-04-18 | End: 2021-04-18

## 2021-04-18 RX ORDER — BUPRENORPHINE HYDROCHLORIDE 0.32 MG/ML
INJECTION INTRAMUSCULAR; INTRAVENOUS
Status: COMPLETED | OUTPATIENT
Start: 2021-04-18 | End: 2021-04-18

## 2021-04-18 RX ORDER — SODIUM CHLORIDE, SODIUM LACTATE, POTASSIUM CHLORIDE, CALCIUM CHLORIDE 600; 310; 30; 20 MG/100ML; MG/100ML; MG/100ML; MG/100ML
9 INJECTION, SOLUTION INTRAVENOUS CONTINUOUS
Status: DISCONTINUED | OUTPATIENT
Start: 2021-04-18 | End: 2021-04-18

## 2021-04-18 RX ORDER — DOCUSATE SODIUM 100 MG/1
100 CAPSULE, LIQUID FILLED ORAL 2 TIMES DAILY
Status: DISCONTINUED | OUTPATIENT
Start: 2021-04-18 | End: 2021-04-18 | Stop reason: HOSPADM

## 2021-04-18 RX ADMIN — FAMOTIDINE 20 MG: 10 INJECTION, SOLUTION INTRAVENOUS at 00:37

## 2021-04-18 RX ADMIN — METOCLOPRAMIDE 10 MG: 5 INJECTION, SOLUTION INTRAMUSCULAR; INTRAVENOUS at 09:39

## 2021-04-18 RX ADMIN — ROCURONIUM BROMIDE 30 MG: 10 INJECTION INTRAVENOUS at 00:54

## 2021-04-18 RX ADMIN — DEXAMETHASONE SODIUM PHOSPHATE 4 MG: 4 INJECTION, SOLUTION INTRA-ARTICULAR; INTRALESIONAL; INTRAMUSCULAR; INTRAVENOUS; SOFT TISSUE at 00:47

## 2021-04-18 RX ADMIN — DOCUSATE SODIUM 100 MG: 100 CAPSULE, LIQUID FILLED ORAL at 09:39

## 2021-04-18 RX ADMIN — METOCLOPRAMIDE 10 MG: 5 INJECTION, SOLUTION INTRAMUSCULAR; INTRAVENOUS at 03:34

## 2021-04-18 RX ADMIN — CEFAZOLIN SODIUM 2 G: 2 INJECTION, SOLUTION INTRAVENOUS at 00:47

## 2021-04-18 RX ADMIN — GLYCOPYRROLATE 0.4 MG: 0.4 INJECTION INTRAMUSCULAR; INTRAVENOUS at 01:42

## 2021-04-18 RX ADMIN — NEOSTIGMINE 3 MG: 1 INJECTION INTRAVENOUS at 01:42

## 2021-04-18 RX ADMIN — GABAPENTIN 400 MG: 400 CAPSULE ORAL at 09:45

## 2021-04-18 RX ADMIN — OXYCODONE HYDROCHLORIDE AND ACETAMINOPHEN 2 TABLET: 10; 325 TABLET ORAL at 11:49

## 2021-04-18 RX ADMIN — BUPRENORPHINE HYDROCHLORIDE 0.3 MG: 0.32 INJECTION INTRAMUSCULAR; INTRAVENOUS at 00:51

## 2021-04-18 RX ADMIN — FENTANYL CITRATE 100 MCG: 50 INJECTION, SOLUTION INTRAMUSCULAR; INTRAVENOUS at 00:47

## 2021-04-18 RX ADMIN — DEXAMETHASONE SODIUM PHOSPHATE 4 MG: 10 INJECTION, SOLUTION INTRAMUSCULAR; INTRAVENOUS at 00:51

## 2021-04-18 RX ADMIN — Medication 180 MG: at 00:47

## 2021-04-18 RX ADMIN — ONDANSETRON 4 MG: 2 INJECTION INTRAMUSCULAR; INTRAVENOUS at 01:42

## 2021-04-18 RX ADMIN — BISACODYL 10 MG: 5 TABLET, COATED ORAL at 09:39

## 2021-04-18 RX ADMIN — SODIUM CHLORIDE, POTASSIUM CHLORIDE, SODIUM LACTATE AND CALCIUM CHLORIDE: 600; 310; 30; 20 INJECTION, SOLUTION INTRAVENOUS at 00:40

## 2021-04-18 RX ADMIN — PROPOFOL 200 MG: 10 INJECTION, EMULSION INTRAVENOUS at 00:47

## 2021-04-18 RX ADMIN — ROCURONIUM BROMIDE 5 MG: 10 INJECTION INTRAVENOUS at 00:47

## 2021-04-18 RX ADMIN — PANTOPRAZOLE SODIUM 40 MG: 40 TABLET, DELAYED RELEASE ORAL at 05:47

## 2021-04-18 RX ADMIN — BUPIVACAINE HYDROCHLORIDE 60 ML: 2.5 INJECTION, SOLUTION EPIDURAL; INFILTRATION; INTRACAUDAL; PERINEURAL at 00:51

## 2021-04-18 NOTE — PROGRESS NOTES
"Patient Name:  Gertrudis Yost  YOB: 1970  [id number]    Surgery Progress Note    Date of visit: 4/18/2021    Subjective   Subjective: Feeling much better this AM. Tolerating diet, pain well controlled. Ambulating appropriately.         Objective     Objective:     /70 (BP Location: Left arm, Patient Position: Lying)   Pulse 76   Temp 97.8 °F (36.6 °C) (Oral)   Resp 18   Ht 167.6 cm (66\")   Wt 95.4 kg (210 lb 4.8 oz)   SpO2 93%   BMI 33.94 kg/m²     Intake/Output Summary (Last 24 hours) at 4/18/2021 0953  Last data filed at 4/18/2021 0946  Gross per 24 hour   Intake 1890 ml   Output 900 ml   Net 990 ml       CV:  Rhythm  regular and rate regular   L:  Clear  to auscultation bilaterally   Abd:  Bowel sounds positive , soft, minimally tender. Dressing c/d/i  Ext:  No cyanosis, clubbing, edema    Recent labs that are back at this time have been reviewed.   -       Assessment/Plan     Assessment/ Plan:    Problem List Items Addressed This Visit        Gastrointestinal Abdominal     Incarcerated umbilical hernia - Primary- Doing well after repair. D/C home. RTC with me in 2 weeks. No lifting > 30 lbs until RTC.  Abdominal binder on when up and about.  May remove dressings in 24 hours, may shower at that time.        Other Visit Diagnoses     Leukocytosis, unspecified type        Incisional hernia        Relevant Orders    Tissue Pathology Exam           Active Hospital Problems    Diagnosis  POA   • **Incisional hernia with obstruction but no gangrene [K43.0]  Yes     Priority: Medium   • Incarcerated umbilical hernia [K42.0]  Yes   • Obesity [E66.9]  Yes   • Chronic neck pain [M54.2, G89.29]  Yes   • Current every day smoker [F17.200]  Yes   • Anxiety [F41.9]  Yes      Resolved Hospital Problems   No resolved problems to display.              Royal Johnson MD  4/18/2021  09:53 EDT      "

## 2021-04-18 NOTE — ANESTHESIA PROCEDURE NOTES
Peripheral Block      Patient reassessed immediately prior to procedure    Patient location during procedure: OR  Reason for block: at surgeon's request and post-op pain management  Performed by  Anesthesiologist: Jose De Jesus Roa MD  Preanesthetic Checklist  Completed: patient identified, IV checked, site marked, risks and benefits discussed, surgical consent, monitors and equipment checked, pre-op evaluation and timeout performed  Prep:  Pt Position: supine  Sterile barriers:cap, gloves, sterile barriers and mask  Prep: ChloraPrep  Patient monitoring: blood pressure monitoring, continuous pulse oximetry and EKG  Procedure  Sedation:yes  Performed under: general  Guidance:ultrasound guided  Images:still images obtained, printed/placed on chart    Laterality:Bilateral  Block Type:TAP  Injection Technique:single-shot  Needle Type:short-bevel and echogenic  Needle Gauge:20 G  Resistance on Injection: none    Medications Used: buprenorphine (BUPRENEX) injection, 0.3 mg  dexamethasone sodium phosphate injection, 4 mg  bupivacaine PF (MARCAINE) 0.25 % injection, 60 mL  Med admintered at 4/18/2021 12:51 AM      Medications  Comment:Block Injection:  LA dose divided between Right and Left block        Post Assessment  Injection Assessment: negative aspiration for heme, incremental injection and no paresthesia on injection  Patient Tolerance:comfortable throughout block  Complications:no  Additional Notes      Under Ultrasound guidance, a BBraun 4inch 360 degree needle was advanced with Normal Saline hydro dissection of tissue.  The Internal Oblique and Transversus Abdominus muscles where visualized.  At or before the aponeurosis of Internal Oblique, local anesthetic spread was visualized in the Transversus Abdominus Plane. Injection was made incrementally with aspiration every 5 mls.  There was no  intravascular injection,  injection pressure was normal, there was no neural injection, and the procedure was completed  without difficulty.  Thank You.

## 2021-04-18 NOTE — DISCHARGE INSTRUCTIONS
You may remove dressings in 24 hours and shower at that time.  Keep abdominal binder on when up and ambulating.

## 2021-04-18 NOTE — ED PROVIDER NOTES
Subjective   50-year-old female presents for evaluation of abdominal pain and nausea/vomiting.  Of note, the patient has a history of chronic pain.  She has had multiple prior abdominal surgeries.  She states that at approximately 11 AM she began experiencing central abdominal pain that has become more diffuse and more severe since that time.  She endorses accompanying nausea and vomiting.  She has not had a bowel movement today.  She states that she has been unable to eat or drink secondary to her symptoms.  Pain is currently 9 out of 10 in severity and worse with movement and palpation.          Review of Systems   Gastrointestinal: Positive for abdominal pain, nausea and vomiting.   All other systems reviewed and are negative.      Past Medical History:   Diagnosis Date   • Abdominal pain    • Anxiety    • BMI 29.0-29.9,adult     BMI > = 18 - 29.6   • Chronic narcotic use     Chronic neck pain.  Dr. Mian Somers.   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 1C1 mucinous adenocarcinoma       No Known Allergies    Past Surgical History:   Procedure Laterality Date   • APPENDECTOMY  04/2016   • CHOLECYSTECTOMY OPEN  04/2016   • MANDIBLE FRACTURE SURGERY      jaw broke and reset   • TOTAL ABDOMINAL HYSTERECTOMY WITH SALPINGO OOPHORECTOMY Bilateral 04/2016    Ovarian cancer debulking   • VENOUS ACCESS DEVICE (PORT) INSERTION Left 05/2016       Family History   Problem Relation Age of Onset   • Heart disease Mother    • Breast cancer Maternal Grandmother         DX AGE UNKNOWN   • BRCA 1/2 Neg Hx    • Colon cancer Neg Hx    • Endometrial cancer Neg Hx    • Ovarian cancer Neg Hx        Social History     Socioeconomic History   • Marital status: Single     Spouse name: Not on file   • Number of children: 1   • Years of education: Not on  file   • Highest education level: Not on file   Tobacco Use   • Smoking status: Current Every Day Smoker     Packs/day: 0.50     Years: 20.00     Pack years: 10.00     Types: Cigarettes   • Tobacco comment: not ready to quit   Substance and Sexual Activity   • Alcohol use: No   • Drug use: No   • Sexual activity: Defer           Objective   Physical Exam  Vitals and nursing note reviewed.   Constitutional:       Appearance: She is well-developed. She is not diaphoretic.      Comments: Nontoxic-appearing female   HENT:      Head: Normocephalic and atraumatic.   Cardiovascular:      Rate and Rhythm: Normal rate and regular rhythm.      Heart sounds: Normal heart sounds. No murmur heard.   No friction rub. No gallop.    Pulmonary:      Effort: Pulmonary effort is normal. No respiratory distress.      Breath sounds: Normal breath sounds. No wheezing or rales.   Abdominal:      General: Bowel sounds are normal. There is no distension.      Palpations: Abdomen is soft. There is no mass.      Tenderness: There is abdominal tenderness. There is guarding. There is no rebound.      Comments: Diffuse abdominal tenderness with voluntary guarding, no focal tenderness noted, no pain out of proportion to exam   Genitourinary:     Comments: No CVA tenderness present  Musculoskeletal:         General: Normal range of motion.      Cervical back: Normal range of motion and neck supple.   Skin:     General: Skin is warm and dry.      Findings: No erythema or rash.   Neurological:      General: No focal deficit present.      Mental Status: She is alert and oriented to person, place, and time.   Psychiatric:         Thought Content: Thought content normal.         Judgment: Judgment normal.         Procedures           ED Course  ED Course as of Apr 18 0034   Sat Apr 17, 2021   2145 50-year-old female presents for evaluation of diffuse abdominal pain and nausea/vomiting since approximately 11 AM.  On arrival to the ED, the patient is  nontoxic-appearing.  Exam remarkable for diffuse, nonfocal abdominal tenderness with voluntary guarding noted.  No pain out of proportion to exam.  We will obtain labs and imaging, and we will reassess following initial interventions.    [DD]   2316 Labs remarkable for white blood cell count of 14,000.  CT revealed incarcerated umbilical hernia containing small bowel with early obstruction.  I went back in and reevaluated the patient.  I was able to palpate a small, firm area in her umbilical region that was not reducible.  I discussed her case with Dr. Johnson of general surgery.  The patient will be taken to the OR for definitive surgical management.  She is aware/agreeable with the plan at this time.    [DD]      ED Course User Index  [DD] Arun Torres MD                                 Recent Results (from the past 24 hour(s))   Comprehensive Metabolic Panel    Collection Time: 04/17/21  9:47 PM    Specimen: Blood   Result Value Ref Range    Glucose 113 (H) 65 - 99 mg/dL    BUN 14 6 - 20 mg/dL    Creatinine 0.62 0.57 - 1.00 mg/dL    Sodium 141 136 - 145 mmol/L    Potassium 4.4 3.5 - 5.2 mmol/L    Chloride 104 98 - 107 mmol/L    CO2 28.0 22.0 - 29.0 mmol/L    Calcium 9.7 8.6 - 10.5 mg/dL    Total Protein 7.5 6.0 - 8.5 g/dL    Albumin 4.70 3.50 - 5.20 g/dL    ALT (SGPT) 19 1 - 33 U/L    AST (SGOT) 17 1 - 32 U/L    Alkaline Phosphatase 96 39 - 117 U/L    Total Bilirubin 0.6 0.0 - 1.2 mg/dL    eGFR Non African Amer 102 >60 mL/min/1.73    Globulin 2.8 gm/dL    A/G Ratio 1.7 g/dL    BUN/Creatinine Ratio 22.6 7.0 - 25.0    Anion Gap 9.0 5.0 - 15.0 mmol/L   Lipase    Collection Time: 04/17/21  9:47 PM    Specimen: Blood   Result Value Ref Range    Lipase 18 13 - 60 U/L   Troponin    Collection Time: 04/17/21  9:47 PM    Specimen: Blood   Result Value Ref Range    Troponin T <0.010 0.000 - 0.030 ng/mL   Light Blue Top    Collection Time: 04/17/21  9:47 PM   Result Value Ref Range    Extra Tube hold for add-on     Green Top (Gel)    Collection Time: 04/17/21  9:47 PM   Result Value Ref Range    Extra Tube Hold for add-ons.    Lavender Top    Collection Time: 04/17/21  9:47 PM   Result Value Ref Range    Extra Tube hold for add-on    Gold Top - SST    Collection Time: 04/17/21  9:47 PM   Result Value Ref Range    Extra Tube Hold for add-ons.    CBC Auto Differential    Collection Time: 04/17/21  9:47 PM    Specimen: Blood   Result Value Ref Range    WBC 14.37 (H) 3.40 - 10.80 10*3/mm3    RBC 4.67 3.77 - 5.28 10*6/mm3    Hemoglobin 13.7 12.0 - 15.9 g/dL    Hematocrit 42.7 34.0 - 46.6 %    MCV 91.4 79.0 - 97.0 fL    MCH 29.3 26.6 - 33.0 pg    MCHC 32.1 31.5 - 35.7 g/dL    RDW 13.5 12.3 - 15.4 %    RDW-SD 45.2 37.0 - 54.0 fl    MPV 11.0 6.0 - 12.0 fL    Platelets 246 140 - 450 10*3/mm3    Neutrophil % 74.6 42.7 - 76.0 %    Lymphocyte % 18.2 (L) 19.6 - 45.3 %    Monocyte % 6.0 5.0 - 12.0 %    Eosinophil % 0.3 0.3 - 6.2 %    Basophil % 0.2 0.0 - 1.5 %    Immature Grans % 0.7 (H) 0.0 - 0.5 %    Neutrophils, Absolute 10.71 (H) 1.70 - 7.00 10*3/mm3    Lymphocytes, Absolute 2.62 0.70 - 3.10 10*3/mm3    Monocytes, Absolute 0.86 0.10 - 0.90 10*3/mm3    Eosinophils, Absolute 0.05 0.00 - 0.40 10*3/mm3    Basophils, Absolute 0.03 0.00 - 0.20 10*3/mm3    Immature Grans, Absolute 0.10 (H) 0.00 - 0.05 10*3/mm3    nRBC 0.0 0.0 - 0.2 /100 WBC   Lactic Acid, Plasma    Collection Time: 04/17/21  9:48 PM    Specimen: Blood   Result Value Ref Range    Lactate 1.3 0.5 - 2.0 mmol/L   Urinalysis With Microscopic If Indicated (No Culture) - Urine, Clean Catch    Collection Time: 04/17/21 10:20 PM    Specimen: Urine, Clean Catch   Result Value Ref Range    Color, UA Yellow Yellow, Straw    Appearance, UA Clear Clear    pH, UA 6.5 5.0 - 8.0    Specific Gravity, UA 1.025 1.001 - 1.030    Glucose, UA Negative Negative    Ketones, UA Negative Negative    Bilirubin, UA Negative Negative    Blood, UA Negative Negative    Protein, UA Negative Negative    Leuk  "Esterase, UA Negative Negative    Nitrite, UA Negative Negative    Urobilinogen, UA 0.2 E.U./dL 0.2 - 1.0 E.U./dL   COVID-19,CEPHEID,GABRIELLA IN-HOUSE(OR EMERGENT/ADD-ON),NP SWAB IN TRANSPORT MEDIA 3-4 HR TAT - Swab, Nasopharynx    Collection Time: 04/17/21 11:26 PM    Specimen: Nasopharynx; Swab   Result Value Ref Range    COVID19 Not Detected Not Detected - Ref. Range     Note: In addition to lab results from this visit, the labs listed above may include labs taken at another facility or during a different encounter within the last 24 hours. Please correlate lab times with ED admission and discharge times for further clarification of the services performed during this visit.    CT Abdomen Pelvis With Contrast   Final Result   There is an incarcerated umbilical hernia containing a small loop of small intestine with early obstruction. The hernia was present on the previous examination but did not contain a small bowel loop.               Signer Name: Caesar Cuadra MD    Signed: 4/17/2021 10:33 PM    Workstation Name: [facility]     Radiology [facility]      XR Chest 1 View   Final Result   No acute cardiopulmonary findings.      Signer Name: Caesar Cuadra MD    Signed: 4/17/2021 8:49 PM    Workstation Name: [facility]     Radiology [facility]        Vitals:    04/17/21 2011 04/18/21 0020   BP: 152/96 122/80   BP Location: Left arm Left arm   Patient Position: Sitting Sitting   Pulse: 77 66   Resp: 18 18   Temp: 97.8 °F (36.6 °C) 98 °F (36.7 °C)   TempSrc: Oral Oral   SpO2: 99% 96%   Weight: 93 kg (205 lb)    Height: 167.6 cm (66\")      Medications   sodium chloride 0.9 % flush 10 mL (has no administration in time range)   HYDROmorphone (DILAUDID) injection 0.5 mg (0 mg Intravenous Hold 4/17/21 2148)   ceFAZolin in dextrose (ANCEF) IVPB solution 2 g (has no administration in time range)   lactated ringers infusion (has no administration in time range)   famotidine (PEPCID) injection 20 mg " (has no administration in time range)   sodium chloride 0.9 % bolus 500 mL (0 mL Intravenous Stopped 4/17/21 2332)   ondansetron (ZOFRAN) injection 4 mg (4 mg Intravenous Given 4/17/21 2146)   iopamidol (ISOVUE-300) 61 % injection 100 mL (80 mL Intravenous Given 4/17/21 2216)     ECG/EMG Results (last 24 hours)     Procedure Component Value Units Date/Time    ECG 12 Lead [[id number]] Collected: 04/17/21 2049     Updated: 04/17/21 2050        ECG 12 Lead                       MDM    Final diagnoses:   Incarcerated umbilical hernia   Leukocytosis, unspecified type       ED Disposition  ED Disposition     ED Disposition Condition Comment    Decision to Admit            No follow-up provider specified.       Medication List      No changes were made to your prescriptions during this visit.          Arun Torres MD  04/18/21 0034

## 2021-04-18 NOTE — ANESTHESIA PROCEDURE NOTES
Airway  Urgency: elective    Date/Time: 4/18/2021 12:47 AM  Airway not difficult    General Information and Staff    Patient location during procedure: OR  Anesthesiologist: Jose De Jesus Roa MD    Indications and Patient Condition  Indications for airway management: airway protection    Preoxygenated: yes  MILS not maintained throughout  Mask difficulty assessment: 1 - vent by mask    Final Airway Details  Final airway type: endotracheal airway      Successful airway: ETT  Cuffed: yes   Successful intubation technique: direct laryngoscopy and RSI  Facilitating devices/methods: cricoid pressure  Endotracheal tube insertion site: oral  Blade: Nayana  Blade size: 3  ETT size (mm): 7.0  Cormack-Lehane Classification: grade I - full view of glottis  Placement verified by: chest auscultation and capnometry   Measured from: lips  ETT/EBT  to lips (cm): 20  Number of attempts at approach: 1  Assessment: lips, teeth, and gum same as pre-op and atraumatic intubation    Additional Comments  Negative epigastric sounds, Breath sound equal bilaterally with symmetric chest rise and fall

## 2021-04-18 NOTE — ANESTHESIA PREPROCEDURE EVALUATION
Anesthesia Evaluation     Patient summary reviewed and Nursing notes reviewed                Airway   Mallampati: II  TM distance: >3 FB  Neck ROM: full  No difficulty expected  Dental - normal exam     Pulmonary - normal exam   (+) a smoker Current,   Cardiovascular - negative cardio ROS and normal exam        Neuro/Psych- negative ROS  GI/Hepatic/Renal/Endo    (+) morbid obesity, GERD,      ROS Comment: Incarcerated umbilical hernia    Musculoskeletal (-) negative ROS    Abdominal  - normal exam    Bowel sounds: normal.   Substance History - negative use     OB/GYN negative ob/gyn ROS         Other                        Anesthesia Plan    ASA 3 - emergent     general with block   (TAP)  intravenous induction     Anesthetic plan, all risks, benefits, and alternatives have been provided, discussed and informed consent has been obtained with: patient.

## 2021-04-18 NOTE — BRIEF OP NOTE
UMBILICAL HERNIA REPAIR  Progress Note    Gertrudis Yost  4/18/2021    Pre-op Diagnosis:   Incisional hernia with obstruction       Post-Op Diagnosis Codes:   Same    Procedure/CPT® Codes:        Procedure(s):  UMBILICAL HERNIA REPAIR WITH MESH    Surgeon(s):  Royal Johnson MD    Anesthesia: General    Staff:   Circulator: Ana Tijerina RN  Scrub Person: Mayelin Ocampo  Nursing Assistant: Morenita Graff  Assistant: Sherry Duckworth PA  Assistant: Sherry Duckworth PA      Estimated Blood Loss: minimal    Urine Voided: * No values recorded between 4/18/2021 12:40 AM and 4/18/2021  1:51 AM *    Specimens:                Specimens     ID Source Type Tests Collected By Collected At Frozen?    A Hernia, Sac Tissue · TISSUE PATHOLOGY EXAM   Royal Johnson MD 4/18/21 0121 No    Description: hernia sac                Drains: * No LDAs found *    Findings: 2x2 cm incisional hernia able to be reduced and repaired with pre-peritoneal placement of a 6x6cm Ventralight ST mesh and closed anteriorly    Complications: None    Assistant: Sherry Duckworth PA  was responsible for performing the following activities: Retraction and their skilled assistance was necessary for the success of this case.    Royal Johnson MD     Date: 4/18/2021  Time: 01:53 EDT

## 2021-04-18 NOTE — ED NOTES
Attempted to obtain EKG- xray has taken pt to do portable.      Jody Valadez, PCT  04/17/21 2028

## 2021-04-18 NOTE — CONSULTS
Patient Name:  Gertrudis Yost  YOB: 1970  [id number]       Patient Care Team:  Natividad Thompson APRN as PCP - General (Family Medicine)  Natividad Thompson APRN as Nurse Practitioner (Family Medicine)      General Surgery Consult Note     Date of Consultation: 04/17/21    Consulting Physician - Arun Torres MD    Reason for Consult - Incisional hernia    Subjective     I have been asked to see  Gertrudis Yost , a 50 y.o. female in consultation for incisional hernia. She reports a 12-hour history of pain near her umbilicus.  This was sharp stabbing and generalized and focused more recently in the periumbilical area.  It was associated with nausea and vomiting of nonbilious nonbloody material.  Her last bowel movement was this morning and otherwise normal.  She has had a history of similar pain in the past but not this severe.  After work she came into the ER with these complaints, and subsequent evaluation revealed evidence of an incarcerated hernia.  This contained bowel.  We were asked to see her for possible surgical management.  She states she is had multiple attacks of pain like this but never this severe.  She had a previous history of open abdominal hysterectomy appendectomy omentectomy and cholecystectomy in 2016 by Dr. Roman.       Allergy: No Known Allergies    Medications:  ceFAZolin, 2 g, Intravenous, Once  HYDROmorphone, 0.5 mg, Intravenous, Once         No current facility-administered medications on file prior to encounter.     Current Outpatient Medications on File Prior to Encounter   Medication Sig   • gabapentin (NEURONTIN) 400 MG capsule As Needed.   • methocarbamol (ROBAXIN) 500 MG tablet As Needed.   • ondansetron (ZOFRAN) 8 MG tablet Take one tablet by mouth an hour prior to procedure   • oxyCODONE-acetaminophen (PERCOCET)  MG per tablet Take 1 tablet by mouth every 6 (six) hours as needed for moderate pain (4-6).       PMHx:   Past Medical History:   Diagnosis  "Date   • Abdominal pain    • Anxiety    • BMI 29.0-29.9,adult     BMI > = 18 - 29.6   • Chronic narcotic use     Chronic neck pain.  Dr. Mian Somers.   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    Tobacco use: Half pack per day   EtOH use : Denies    Illicit drug use: Denies      Review of Systems        Constitutional: No fevers, chills or malaise   Eyes: Denies visual changes    Cardiovascular: Denies chest pain, palpitations   Pulmonary: Denies cough or shortness of breath   Abdominal/ GI: See HPI    Genitourinary: Denies dysuria or hematuria   Musculoskeletal: Denies any but chronic joint aches, pains or deformities   Psychiatric: No recent mood changes   Neurologic: No paresthesias or loss of function          Objective     Physical Exam:      Vital Signs  /96 (BP Location: Left arm, Patient Position: Sitting)   Pulse 77   Temp 97.8 °F (36.6 °C) (Oral)   Resp 18   Ht 167.6 cm (66\")   Wt 93 kg (205 lb)   SpO2 99%   BMI 33.09 kg/m²     Intake/Output Summary (Last 24 hours) at 4/17/2021 2350  Last data filed at 4/17/2021 2332  Gross per 24 hour   Intake 500 ml   Output --   Net 500 ml         Physical Exam:    Head: Normocephalic, atraumatic.   Eyes: Pupils equal, round, react to light and accommodation.   Mouth: Oral mucosa without lesions,   Neck: " No masses, lymphadenopathy or carotid bruits bilaterally   CV: Rhythm and rate regular , no  murmurs, rubs or gallops  Lungs: Clear  to auscultation bilaterally   Abdomen: Bowel sounds positive , soft, obese.  She has a firm hard incisional hernia of approximately 1 to 2 cm at the level of the umbilicus.  This is not reducible.  She has a vertical midline incision.  Groin : No obvious hernias bilaterally   Extremities:  No cyanosis, clubbing or edema bilaterally   Lymphatics: No abnormal lymphadenopathy appreciated   Neurologic: No gross deficits       Results Review: I have personally reviewed all of the recent lab and imaging results available at this time.  Laboratory exam reveals a white count of 14,000 with a hemoglobin of 13.7 and a plate count 246.  Comprehensive metabolic panel essentially normal.  Lactate 1.3.  Urinalysis negative.    CT scan of the abdomen and pelvis was personally viewed by me as well as a final dictated and edited report.  This demonstrates multiple incisional hernias containing only fat in the upper abdomen.  However immediately adjacent to the umbilicus is a small incisional hernia containing a knuckle of small intestines with early bowel obstruction.  There is some free fluid in the hernia sac only.  There is no free air or free fluid in the abdomen.  No hernias lower in the abdomen.  The appendix and gallbladder are surgically absent, as are the uterus and ovaries.       Assessment/Plan     Assessment and Plan:    Problem List Items Addressed This Visit     None      Incarcerated incisional hernia-  She has evidence of a small incarcerated incisional hernia.  I discussed the risk and benefits of open repair with her emergently, and she agrees to proceed.  I have made it very clear that we are not fixing all of her hernias today, but rather just the one involving the knuckle of small bowel.  Hopefully if we act quickly we can save the bowel and this will not require resection but that  is a possibility.  She understands and agrees to proceed.      Obesity  History of ovarian cancer  Chronic back pain on narcotics- This may make postoperative pain control difficult.  We will proceed with TAP blocks perioperatively.           I discussed the patient's findings and my recommendations with the patient and/or family, as well as the primary team     Royal Johnson MD  04/17/21  23:54 EDT

## 2021-04-18 NOTE — PLAN OF CARE
Goal Outcome Evaluation:        Outcome Summary: VSS, no prn meds at this time, patient ambulated from stretcher to bathroom to bed,  mL upon arrival, monitoring

## 2021-04-18 NOTE — OP NOTE
OPERATIVE NOTE    Patient Name:  Gertrudis Yost  YOB: 1970  [id number]    Date of Surgery:  4/18/2021      PREOPERATIVE DIAGNOSIS: Incisional hernia with obstruction      POSTOPERATIVE DIAGNOSIS: Same        PROCEDURE PERFORMED: Open incisional hernia repair with mesh       SURGEON: Royal Johnson MD       Circulator: Ana Tijerina RN  Scrub Person: Mayelin Ocampo  Nursing Assistant: Morenita Graff  Assistant: Sherry Duckworth PA       Assistant: Sherry Duckworth PA    SPECIMENS: Hernia sac       ANESTHESIA: General.        FINDINGS:   1.  2 x 2 centimeter incarcerated digital hernia able to be reduced and repaired with a preperitoneal retrofascial placement of a 6 x 6 cm ventralight ST mesh, and closed primarily anteriorly.       INDICATIONS: The patient is a 50 y.o. female who presented with an incisional hernia near her umbilicus containing a knuckle of small bowel with obstruction.  She has a history of morbid obesity, chronic tobacco use, and chronic incisional hernias in these locations.  Given the finding of incarcerated bowel, the decision was made to proceed emergently to the operating room.  The risks and benefits were discussed at length with the patient, who agreed to proceed.       DESCRIPTION OF PROCEDURE:      After obtaining informed consent, the patient was taken to the operating room and placed in supine  position. After appropriate DVT and antibiotic prophylaxis, general anesthesia was induced. TAP blocks were placed by the anesthesia staff bilaterally to aid in post-op pain control . The abdomen  was prepped and draped in standard sterile fashion and covered with an Ioban dressing to prevent contact of mesh with the skin.  A vertical midline incision through the patient's previous incision was made circling around the umbilicus.  Blunt and electrocautery dissection was then carried down to the hernia sac which was clearly identified.  With the patient under general  anesthesia the hernia contents could be reduced.  The hernia sac was freed down to the fascial layer, and the distal end open.  It now contained no contents.  Using meticulous dissection circumferentially a previous peritoneal plane was created around the hernia defect itself.  The hernia fascial defect was roughly 2 x 2 cm.  Therefore a 6 x 6 cm piece of Ventralight ST mesh was chosen.  The peritoneum was closed primarily using observable suture.  The mesh was placed in a retrofascial, preperitoneal location intact using transfascial sutures of #1 PDS.  This allowed for excellent closure of the hernia defect with overlap of 2 cm in all directions.  The midline fascia was then closed primarily using interrupted #1 PDS sutures as well anteriorly to the mesh.  The wound was irrigated with saline, and closed in 2 layers using observable suture.  The incision was dressed in standard sterile fashion, and covered with a dry sterile dressing.  An abdominal binder was placed.    All sponge and needle counts were correct times two at the completion of the procedure. The patient recovered from anesthesia, was extubated in the operating room  and was transported to the PACU  in stable condition.    Assistant: Sherry Duckworth PA  was responsible for performing the following activities: Retraction, Suction, Irrigation and Closing and their skilled assistance was necessary for the success of this case.    Royal Johnson MD  4/18/2021  02:01 EDT

## 2021-04-18 NOTE — ANESTHESIA POSTPROCEDURE EVALUATION
Patient: Gertrudis Yost    Procedure Summary     Date: 04/18/21 [facility] / [facility]    Anesthesia Start: 0040 Anesthesia Stop: 0157    Procedure: UMBILICAL HERNIA REPAIR WITH MESH (N/A Abdomen) Diagnosis:     Surgeons: Royal Johnson MD Provider: Jose De Jesus Roa MD    Anesthesia Type: general with block ASA Status: 3 - Emergent          Anesthesia Type: general with block    Vitals  No vitals data found for the desired time range.          Post Anesthesia Care and Evaluation    Patient location during evaluation: PACU  Patient participation: complete - patient participated  Level of consciousness: awake and alert  Pain management: adequate  Airway patency: patent  Anesthetic complications: No anesthetic complications  PONV Status: none  Cardiovascular status: hemodynamically stable and acceptable  Respiratory status: nonlabored ventilation, acceptable and nasal cannula  Hydration status: acceptable

## 2021-04-20 LAB
CYTO UR: NORMAL
LAB AP CASE REPORT: NORMAL
LAB AP CLINICAL INFORMATION: NORMAL
PATH REPORT.FINAL DX SPEC: NORMAL
PATH REPORT.GROSS SPEC: NORMAL

## 2021-05-14 ENCOUNTER — LAB (OUTPATIENT)
Dept: LAB | Facility: HOSPITAL | Age: 51
End: 2021-05-14

## 2021-05-14 ENCOUNTER — OFFICE VISIT (OUTPATIENT)
Dept: GYNECOLOGIC ONCOLOGY | Facility: CLINIC | Age: 51
End: 2021-05-14

## 2021-05-14 VITALS
RESPIRATION RATE: 16 BRPM | TEMPERATURE: 99.1 F | OXYGEN SATURATION: 98 % | HEART RATE: 66 BPM | HEIGHT: 66 IN | WEIGHT: 208.8 LBS | SYSTOLIC BLOOD PRESSURE: 123 MMHG | DIASTOLIC BLOOD PRESSURE: 84 MMHG | BODY MASS INDEX: 33.56 KG/M2

## 2021-05-14 DIAGNOSIS — Z15.89 MONOALLELIC MUTATION OF CHEK2 GENE IN FEMALE PATIENT: ICD-10-CM

## 2021-05-14 DIAGNOSIS — Z91.89 AT HIGH RISK FOR BREAST CANCER: ICD-10-CM

## 2021-05-14 DIAGNOSIS — Z15.09 MONOALLELIC MUTATION OF CHEK2 GENE IN FEMALE PATIENT: ICD-10-CM

## 2021-05-14 DIAGNOSIS — Z15.02 MONOALLELIC MUTATION OF CHEK2 GENE IN FEMALE PATIENT: ICD-10-CM

## 2021-05-14 DIAGNOSIS — C56.1 MALIGNANT NEOPLASM OF RIGHT OVARY (HCC): Primary | ICD-10-CM

## 2021-05-14 DIAGNOSIS — Z15.01 MONOALLELIC MUTATION OF CHEK2 GENE IN FEMALE PATIENT: ICD-10-CM

## 2021-05-14 LAB — CANCER AG125 SERPL QL: 17.7 U/ML (ref 0–38.1)

## 2021-05-14 PROCEDURE — 86304 IMMUNOASSAY TUMOR CA 125: CPT | Performed by: NURSE PRACTITIONER

## 2021-05-14 PROCEDURE — 99214 OFFICE O/P EST MOD 30 MIN: CPT | Performed by: NURSE PRACTITIONER

## 2021-05-14 PROCEDURE — 36415 COLL VENOUS BLD VENIPUNCTURE: CPT | Performed by: NURSE PRACTITIONER

## 2021-05-19 ENCOUNTER — TELEPHONE (OUTPATIENT)
Dept: GYNECOLOGIC ONCOLOGY | Facility: CLINIC | Age: 51
End: 2021-05-19

## 2021-05-19 DIAGNOSIS — Z85.43 HISTORY OF OVARIAN CANCER: Primary | ICD-10-CM

## 2021-05-19 NOTE — TELEPHONE ENCOUNTER
Called patient and notified of CA-125 results. There has been a few point increase from 13.2 to 17.7. Overall, I think this is within her baseline, but discussed trending this again at 3 months instead of 6 months to follow closely. Patient v/u and agrees. Order entered to repeat at any [facility] in 3 months.

## 2021-05-19 NOTE — TELEPHONE ENCOUNTER
Caller: PT    Relationship: SELF    Best call back number: [phone]    Caller requesting test results: YES     What test was performed: LABS    When was the test performed: 5/14/21    Where was the test performed: [facility]    Additional notes: PT WANTS LAB RESULTS AND SHE WANTED TO SPEAK WITH [facility] OR DR BAKER

## 2021-07-22 ENCOUNTER — TRANSCRIBE ORDERS (OUTPATIENT)
Dept: ADMINISTRATIVE | Facility: HOSPITAL | Age: 51
End: 2021-07-22

## 2021-07-22 DIAGNOSIS — Z12.31 VISIT FOR SCREENING MAMMOGRAM: Primary | ICD-10-CM

## 2021-07-26 ENCOUNTER — HOSPITAL ENCOUNTER (OUTPATIENT)
Dept: MAMMOGRAPHY | Facility: HOSPITAL | Age: 51
Discharge: HOME | End: 2021-07-26
Admitting: NURSE PRACTITIONER

## 2021-07-26 DIAGNOSIS — Z12.31 VISIT FOR SCREENING MAMMOGRAM: ICD-10-CM

## 2021-07-26 PROCEDURE — 77063 BREAST TOMOSYNTHESIS BI: CPT | Performed by: RADIOLOGY

## 2021-07-26 PROCEDURE — 77063 BREAST TOMOSYNTHESIS BI: CPT

## 2021-07-26 PROCEDURE — 77067 SCR MAMMO BI INCL CAD: CPT | Performed by: RADIOLOGY

## 2021-07-26 PROCEDURE — 77067 SCR MAMMO BI INCL CAD: CPT

## 2021-08-25 ENCOUNTER — TELEPHONE (OUTPATIENT)
Dept: GYNECOLOGIC ONCOLOGY | Facility: CLINIC | Age: 51
End: 2021-08-25

## 2021-08-25 ENCOUNTER — LAB (OUTPATIENT)
Dept: LAB | Facility: HOSPITAL | Age: 51
End: 2021-08-25

## 2021-08-25 DIAGNOSIS — Z85.43 HISTORY OF OVARIAN CANCER: ICD-10-CM

## 2021-08-25 LAB — CANCER AG125 SERPL QL: 14.5 U/ML (ref 0–38.1)

## 2021-08-25 PROCEDURE — 86304 IMMUNOASSAY TUMOR CA 125: CPT

## 2021-08-25 PROCEDURE — 36415 COLL VENOUS BLD VENIPUNCTURE: CPT

## 2021-08-25 NOTE — TELEPHONE ENCOUNTER
----- Message from NELSON Stone sent at 8/25/2021  1:10 PM EDT -----  Please notify CA-125 low/stable. Thanks!

## 2022-04-13 ENCOUNTER — TELEPHONE (OUTPATIENT)
Dept: GYNECOLOGIC ONCOLOGY | Facility: CLINIC | Age: 52
End: 2022-04-13

## 2022-04-13 NOTE — TELEPHONE ENCOUNTER
Caller: Gertrudis Yost    Relationship to patient: Self    Best call back number: [phone]    Chief complaint:PATIENT TO SCHEDULE FROM MISSED APPT    Type of visit:FU    Requested date:  4/22/22 AROUND 11 OR NEXT AVAILABLE

## 2022-04-22 ENCOUNTER — OFFICE VISIT (OUTPATIENT)
Dept: GYNECOLOGIC ONCOLOGY | Facility: CLINIC | Age: 52
End: 2022-04-22

## 2022-04-22 ENCOUNTER — LAB (OUTPATIENT)
Dept: LAB | Facility: HOSPITAL | Age: 52
End: 2022-04-22

## 2022-04-22 VITALS
SYSTOLIC BLOOD PRESSURE: 122 MMHG | HEART RATE: 60 BPM | WEIGHT: 200 LBS | OXYGEN SATURATION: 98 % | TEMPERATURE: 97.5 F | BODY MASS INDEX: 34.15 KG/M2 | DIASTOLIC BLOOD PRESSURE: 76 MMHG | HEIGHT: 64 IN | RESPIRATION RATE: 16 BRPM

## 2022-04-22 DIAGNOSIS — Z15.09 MONOALLELIC MUTATION OF CHEK2 GENE IN FEMALE PATIENT: ICD-10-CM

## 2022-04-22 DIAGNOSIS — Z15.02 MONOALLELIC MUTATION OF CHEK2 GENE IN FEMALE PATIENT: ICD-10-CM

## 2022-04-22 DIAGNOSIS — Z15.89 MONOALLELIC MUTATION OF CHEK2 GENE IN FEMALE PATIENT: ICD-10-CM

## 2022-04-22 DIAGNOSIS — Z15.01 MONOALLELIC MUTATION OF CHEK2 GENE IN FEMALE PATIENT: ICD-10-CM

## 2022-04-22 DIAGNOSIS — R13.10 DYSPHAGIA, UNSPECIFIED TYPE: ICD-10-CM

## 2022-04-22 DIAGNOSIS — Z91.89 AT HIGH RISK FOR BREAST CANCER: ICD-10-CM

## 2022-04-22 DIAGNOSIS — Z85.43 HISTORY OF OVARIAN CANCER: Primary | ICD-10-CM

## 2022-04-22 LAB — CANCER AG125 SERPL QL: 13.9 U/ML (ref 0–38.1)

## 2022-04-22 PROCEDURE — 36415 COLL VENOUS BLD VENIPUNCTURE: CPT | Performed by: NURSE PRACTITIONER

## 2022-04-22 PROCEDURE — 86304 IMMUNOASSAY TUMOR CA 125: CPT | Performed by: NURSE PRACTITIONER

## 2022-04-22 PROCEDURE — 99214 OFFICE O/P EST MOD 30 MIN: CPT | Performed by: NURSE PRACTITIONER
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
## 2022-04-25 ENCOUNTER — TELEPHONE (OUTPATIENT)
Dept: OBSTETRICS AND GYNECOLOGY | Facility: HOSPITAL | Age: 52
End: 2022-04-25

## 2022-06-08 ENCOUNTER — OFFICE VISIT (OUTPATIENT)
Dept: GASTROENTEROLOGY | Facility: CLINIC | Age: 52
End: 2022-06-08

## 2022-06-08 VITALS
BODY MASS INDEX: 34.89 KG/M2 | HEIGHT: 64 IN | WEIGHT: 204.4 LBS | OXYGEN SATURATION: 99 % | SYSTOLIC BLOOD PRESSURE: 120 MMHG | HEART RATE: 67 BPM | TEMPERATURE: 98 F | DIASTOLIC BLOOD PRESSURE: 74 MMHG

## 2022-06-08 DIAGNOSIS — R13.19 ESOPHAGEAL DYSPHAGIA: Primary | ICD-10-CM

## 2022-06-08 DIAGNOSIS — Z15.09 MONOALLELIC MUTATION OF CHEK2 GENE IN FEMALE PATIENT: ICD-10-CM

## 2022-06-08 DIAGNOSIS — Z15.89 MONOALLELIC MUTATION OF CHEK2 GENE IN FEMALE PATIENT: ICD-10-CM

## 2022-06-08 DIAGNOSIS — Z01.818 PREOPERATIVE CLEARANCE: Primary | ICD-10-CM

## 2022-06-08 DIAGNOSIS — Z15.02 MONOALLELIC MUTATION OF CHEK2 GENE IN FEMALE PATIENT: ICD-10-CM

## 2022-06-08 DIAGNOSIS — Z15.01 MONOALLELIC MUTATION OF CHEK2 GENE IN FEMALE PATIENT: ICD-10-CM

## 2022-06-08 PROCEDURE — 99214 OFFICE O/P EST MOD 30 MIN: CPT | Performed by: PHYSICIAN ASSISTANT

## 2022-06-08 NOTE — PROGRESS NOTES
New Patient Consultation     Patient Name: Gertrudis Yost  : 1970   MRN: [id number]     Chief Complaint:    Chief Complaint   Patient presents with   • Difficulty Swallowing     Due for colonoscopy       History of Present Illness: Gertrudis Yost is a 51 y.o. female, PMH includes chronic neck pain on opiate medication, previous ovarian cancer, anxiety, who is here today for a Gastroenterology Consultation for esophageal dysphagia.     Pt notes few year h/o esophageal dysphagia, feeling bolus sensation in mid esophagus that requires repeated swallowing and occasional regurgitation. She notes daily sx with both solids and liquids. She denies choking, aspiration or difficulty handling secretion. Recent EGD with dilation, no improvement of sx.     Patient denies associated fever, chills, abdominal pain, indigestion, nausea, vomiting, diarrhea, constipation, hematemesis, hematochezia, melena, bloating, weight loss or gain, dysuria, jaundice or bruising.    Patient denies personal or FHx of PUD, H Pylori, gastritis, pancreatitis, colitis, Celiac disease, UC, Crohn's disease, IBS, colon or gastric cancers. Pt denies EtOH, illicit substance or NSAID use. Ecigarette use daily. PMH also significant for CHEK2 gene mutation.     EGD 2022 with Dr. Porras in [facility]. Spastic esophagus, ?achalasia. TT@ dilator to 18mm. Normal stomach, duodenum.    No previous CSY.     Subjective      Review of Systems:   Review of Systems   Constitutional: Negative for appetite change, chills, diaphoresis, fatigue, fever, unexpected weight gain and unexpected weight loss.   HENT: Positive for trouble swallowing. Negative for drooling, facial swelling, mouth sores, rhinorrhea, sore throat, tinnitus and voice change.    Eyes: Negative.    Respiratory: Negative for cough, chest tightness and shortness of breath.    Cardiovascular: Negative for chest pain.   Gastrointestinal: Negative for abdominal pain, blood in stool, constipation,  diarrhea, nausea, vomiting, GERD and indigestion.   Genitourinary: Negative for dysuria, flank pain, hematuria and pelvic pain.   Musculoskeletal: Negative for arthralgias and myalgias.   Skin: Negative for color change, pallor and rash.   Neurological: Negative for dizziness, tremors, syncope, weakness and numbness.   Psychiatric/Behavioral: Negative for hallucinations and sleep disturbance. The patient is not nervous/anxious.    All other systems reviewed and are negative.      Past Medical History:   Past Medical History:   Diagnosis Date   • Abdominal pain    • Anxiety    • BMI 29.0-29.9,adult     BMI > = 18 - 29.6   • Chronic narcotic use     Chronic neck pain.  Dr. Mian Somers.   • Chronic neck pain    • Current every day smoker    • Dehydration    • Drug therapy 2016   • Emesis 8/25/2016   • GERD (gastroesophageal reflux disease)    • Leukocytosis 8/25/2016   • Ovarian cancer (HCC) 04/2016    Stage 1C1 mucinous adenocarcinoma       Past Surgical History:   Past Surgical History:   Procedure Laterality Date   • APPENDECTOMY  04/2016   • CHOLECYSTECTOMY OPEN  04/2016   • ENDOSCOPY  04/15/2022   • HERNIA REPAIR  2021   • MANDIBLE FRACTURE SURGERY      jaw broke and reset   • TOTAL ABDOMINAL HYSTERECTOMY WITH SALPINGO OOPHORECTOMY Bilateral 04/2016    Ovarian cancer debulking   • UMBILICAL HERNIA REPAIR N/A 04/18/2021    Procedure: UMBILICAL HERNIA REPAIR WITH MESH;  Surgeon: Royal Johnson MD;  Location: [facility];  Service: General;  Laterality: N/A;   • VENOUS ACCESS DEVICE (PORT) INSERTION Left 05/2016       Family History:   Family History   Problem Relation Age of Onset   • Heart disease Mother    • Breast cancer Maternal Grandmother         DX AGE UNKNOWN   • BRCA 1/2 Neg Hx    • Colon cancer Neg Hx    • Endometrial cancer Neg Hx    • Ovarian cancer Neg Hx        Social History:   Social History     Socioeconomic History   • Marital status: Single   • Number of children: 1   Tobacco Use   • Smoking  "status: Former Smoker     Packs/day: 0.50     Years: 20.00     Pack years: 10.00     Types: Cigarettes     Quit date: 2022     Years since quittin.3   • Smokeless tobacco: Never Used   • Tobacco comment: not ready to quit vaping   Vaping Use   • Vaping Use: Every day   • Start date: 2022   • Substances: Nicotine, Flavoring   • Devices: Disposable   Substance and Sexual Activity   • Alcohol use: No   • Drug use: No   • Sexual activity: Not Currently       Alcohol/Tobacco History:   Social History     Substance and Sexual Activity   Alcohol Use No     Social History     Tobacco Use   Smoking Status Former Smoker   • Packs/day: 0.50   • Years: 20.00   • Pack years: 10.00   • Types: Cigarettes   • Quit date: 2022   • Years since quittin.3   Smokeless Tobacco Never Used   Tobacco Comment    not ready to quit vaping       Medications:     Current Outpatient Medications:   •  gabapentin (NEURONTIN) 400 MG capsule, As Needed., Disp: , Rfl:   •  oxyCODONE-acetaminophen (PERCOCET)  MG per tablet, Take 1 tablet by mouth every 6 (six) hours as needed for moderate pain (4-6)., Disp: , Rfl:     Allergies:   No Known Allergies    Objective     Physical Exam:  Vital Signs:   Vitals:    22 0933   BP: 120/74   BP Location: Left arm   Patient Position: Sitting   Cuff Size: Adult   Pulse: 67   Temp: 98 °F (36.7 °C)   TempSrc: Temporal   SpO2: 99%   Weight: 92.7 kg (204 lb 6.4 oz)   Height: 163.2 cm (64.25\")     Body mass index is 34.81 kg/m².     Physical Exam  Vitals and nursing note reviewed.   Constitutional:       Appearance: Normal appearance. She is normal weight. She is not ill-appearing or diaphoretic.   HENT:      Head: Normocephalic and atraumatic.      Right Ear: External ear normal.      Left Ear: External ear normal.      Nose: Nose normal. No rhinorrhea.      Mouth/Throat:      Mouth: Mucous membranes are moist.      Pharynx: Oropharynx is clear. No posterior oropharyngeal erythema. "   Eyes:      General:         Right eye: No discharge.         Left eye: No discharge.      Conjunctiva/sclera: Conjunctivae normal.      Pupils: Pupils are equal, round, and reactive to light.   Neck:      Thyroid: No thyromegaly.   Cardiovascular:      Rate and Rhythm: Normal rate and regular rhythm.      Pulses: Normal pulses.      Heart sounds: Normal heart sounds. No murmur heard.  Pulmonary:      Effort: Pulmonary effort is normal.      Breath sounds: Normal breath sounds. No wheezing.   Abdominal:      General: Abdomen is flat. Bowel sounds are normal. There is no distension.      Tenderness: There is no abdominal tenderness. There is no guarding or rebound.   Musculoskeletal:         General: No tenderness. Normal range of motion.      Cervical back: Normal range of motion and neck supple.      Right lower leg: No edema.      Left lower leg: No edema.   Skin:     General: Skin is warm and dry.      Capillary Refill: Capillary refill takes less than 2 seconds.      Coloration: Skin is not jaundiced.      Findings: No bruising.   Neurological:      General: No focal deficit present.      Mental Status: She is oriented to person, place, and time.      Cranial Nerves: No cranial nerve deficit.   Psychiatric:         Mood and Affect: Mood normal.         Thought Content: Thought content normal.         Judgment: Judgment normal.         Assessment / Plan      Assessment/Plan:   There are no diagnoses linked to this encounter.     Esophageal dysphagia  ?Achlasia  CHEK2 gene mutation  Need for screening CSY   - pt advised to trial 1-2 altoids, chew 15-30min before meals   - pt given instructions to chew food thoroughly, slowly and to take sips of liquids between bites   - obtain XR esophagram with tablet   - obtain esophageal manometry   - schedule for CSY   - follow up in clinic after completion of above studies   - call clinic at any time for questions or new / worsened sx    Follow Up:   Return in about 6 weeks  (around 7/20/2022).    Plan of care reviewed with the patient at the conclusion of today's visit.  Education was provided regarding diagnosis, management, and any prescribed or recommended OTC medications.  Patient verbalized understanding of and agreement with management plan.     Time Statement:   Discussed plan of care in detail with patient today. Patient verbally understands and agrees. I have spent 45 minutes reviewing available diagnostics, obtaining history, examining the patient, developing a treatment plan, and educating the patient on disease process and plan of care.    Ellie Porras PA-C   [facility]

## 2022-06-14 ENCOUNTER — APPOINTMENT (OUTPATIENT)
Dept: PREADMISSION TESTING | Facility: HOSPITAL | Age: 52
End: 2022-06-14

## 2022-06-17 ENCOUNTER — APPOINTMENT (OUTPATIENT)
Dept: GENERAL RADIOLOGY | Facility: HOSPITAL | Age: 52
End: 2022-06-17

## 2022-06-21 ENCOUNTER — LAB (OUTPATIENT)
Dept: PREADMISSION TESTING | Facility: HOSPITAL | Age: 52
End: 2022-06-21

## 2022-06-21 DIAGNOSIS — Z12.11 COLON CANCER SCREENING: Primary | ICD-10-CM

## 2022-06-21 DIAGNOSIS — Z01.818 PREOPERATIVE CLEARANCE: ICD-10-CM

## 2022-06-21 LAB — SARS-COV-2 RNA PNL SPEC NAA+PROBE: NOT DETECTED

## 2022-06-21 PROCEDURE — C9803 HOPD COVID-19 SPEC COLLECT: HCPCS

## 2022-06-21 PROCEDURE — U0004 COV-19 TEST NON-CDC HGH THRU: HCPCS

## 2022-06-23 ENCOUNTER — PRIOR AUTHORIZATION (OUTPATIENT)
Dept: GASTROENTEROLOGY | Facility: CLINIC | Age: 52
End: 2022-06-23

## 2022-06-23 ENCOUNTER — HOSPITAL ENCOUNTER (OUTPATIENT)
Facility: HOSPITAL | Age: 52
Setting detail: HOSPITAL OUTPATIENT SURGERY
Discharge: HOME | End: 2022-06-23
Attending: SURGERY | Admitting: SURGERY

## 2022-06-23 PROCEDURE — 91010 ESOPHAGUS MOTILITY STUDY: CPT | Performed by: SURGERY

## 2022-06-23 RX ORDER — LIDOCAINE HYDROCHLORIDE 20 MG/ML
SOLUTION OROPHARYNGEAL AS NEEDED
Status: DISCONTINUED | OUTPATIENT
Start: 2022-06-23 | End: 2022-06-23 | Stop reason: HOSPADM

## 2022-06-24 ENCOUNTER — HOSPITAL ENCOUNTER (OUTPATIENT)
Dept: GENERAL RADIOLOGY | Facility: HOSPITAL | Age: 52
Discharge: HOME | End: 2022-06-24
Admitting: PHYSICIAN ASSISTANT

## 2022-06-24 DIAGNOSIS — R13.19 ESOPHAGEAL DYSPHAGIA: ICD-10-CM

## 2022-06-24 PROCEDURE — 74220 X-RAY XM ESOPHAGUS 1CNTRST: CPT

## 2022-06-27 ENCOUNTER — TELEPHONE (OUTPATIENT)
Dept: GASTROENTEROLOGY | Facility: CLINIC | Age: 52
End: 2022-06-27

## 2022-06-27 NOTE — TELEPHONE ENCOUNTER
----- Message from Ellie Porras PA-C sent at 6/27/2022  8:21 AM EDT -----  Please let Gertrudis know that her esophagram reveals moderate esophageal dysmotility, mild delay in esophageal emptying. Significant luminal narrowing of distal esophagus. This will be further evaluated at time of endoscopy. Thanks!

## 2022-06-27 NOTE — PROGRESS NOTES
Please let Gertrudis know that her esophagram reveals moderate esophageal dysmotility, mild delay in esophageal emptying. Significant luminal narrowing of distal esophagus. This will be further evaluated at time of endoscopy. Thanks!

## 2022-07-14 ENCOUNTER — OUTSIDE FACILITY SERVICE (OUTPATIENT)
Dept: GASTROENTEROLOGY | Facility: CLINIC | Age: 52
End: 2022-07-14

## 2022-07-14 PROCEDURE — 45378 DIAGNOSTIC COLONOSCOPY: CPT | Performed by: INTERNAL MEDICINE

## 2022-07-28 ENCOUNTER — OFFICE VISIT (OUTPATIENT)
Dept: GASTROENTEROLOGY | Facility: CLINIC | Age: 52
End: 2022-07-28

## 2022-07-28 DIAGNOSIS — Z12.11 COLON CANCER SCREENING: ICD-10-CM

## 2022-07-28 DIAGNOSIS — A63.0 ANAL CONDYLOMA: ICD-10-CM

## 2022-07-28 DIAGNOSIS — R13.19 ESOPHAGEAL DYSPHAGIA: Primary | ICD-10-CM

## 2022-07-28 PROCEDURE — 99214 OFFICE O/P EST MOD 30 MIN: CPT | Performed by: PHYSICIAN ASSISTANT

## 2022-07-28 RX ORDER — OMEPRAZOLE 40 MG/1
40 CAPSULE, DELAYED RELEASE ORAL DAILY
Qty: 30 CAPSULE | Refills: 5 | Status: SHIPPED | OUTPATIENT
Start: 2022-07-28

## 2022-07-28 NOTE — PROGRESS NOTES
Follow Up      Patient Name: Gertrudis Yost  : 1970   MRN: [id number]     Chief Complaint:  No chief complaint on file.      History of Present Illness: Gertrudis Yost is a 52 y.o. female who is here today for post procedure follow up.    Esophageal manometry was not completed, as pt did not tolerate the probe.     XR esophagram : moderate esophageal dysmotility, mild delay in esophageal emptying. Significant luminal narrowing of distal esophagus.     CSY  with Dr. Thomas. Poor bowel prep conditions. Nonbleeding internal hemorrhoids. 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 Recommend repeat CSY due to poor prep conditions. Consider colorectal surgery consult.     Pt notes no improvement of esophageal dysphagia sx with addition of altoids AC. She denies use of antacids in the past for this issues. She continues to experience midesophagus bolus sensation and regurgitation of foods and liquids daily.     Patient denies associated fever, chills, abdominal pain, indigestion, nausea, vomiting, diarrhea, constipation, hematemesis, hematochezia, melena, bloating, weight loss or gain, dysuria, jaundice or bruising.    Subjective      Review of Systems:   Review of Systems   Constitutional: Negative for appetite change, chills, diaphoresis, fatigue, fever, unexpected weight gain and unexpected weight loss.   HENT: Positive for trouble swallowing. Negative for drooling, facial swelling, mouth sores, rhinorrhea, sore throat, tinnitus and voice change.    Eyes: Negative.    Respiratory: Negative for cough, chest tightness and shortness of breath.    Cardiovascular: Negative for chest pain.   Gastrointestinal: Negative for abdominal pain, blood in stool, constipation, diarrhea, nausea, vomiting, GERD and indigestion.   Genitourinary: Negative for dysuria, flank pain, hematuria and pelvic pain.   Musculoskeletal: Negative for arthralgias and myalgias.   Skin: Negative for color change, pallor and  rash.   Neurological: Negative for dizziness, tremors, syncope, weakness and numbness.   Psychiatric/Behavioral: Negative for hallucinations and sleep disturbance. The patient is not nervous/anxious.    All other systems reviewed and are negative.      Medications:     Current Outpatient Medications:   •  gabapentin (NEURONTIN) 400 MG capsule, As Needed., Disp: , Rfl:   •  oxyCODONE-acetaminophen (PERCOCET)  MG per tablet, Take 1 tablet by mouth every 6 (six) hours as needed for moderate pain (4-6)., Disp: , Rfl:   •  Sodium Sulfate-Mag Sulfate-KCl 0589-477-167 MG tablet, Take 1 kit by mouth Take As Directed., Disp: 24 tablet, Rfl: 0    Allergies:   No Known Allergies    Social History:   Social History     Socioeconomic History   • Marital status: Single   • Number of children: 1   Tobacco Use   • Smoking status: Former Smoker     Packs/day: 0.50     Years: 20.00     Pack years: 10.00     Types: Cigarettes     Quit date: 2022     Years since quittin.5   • Smokeless tobacco: Never Used   • Tobacco comment: not ready to quit vaping   Vaping Use   • Vaping Use: Every day   • Start date: 2022   • Substances: Nicotine, Flavoring   • Devices: Disposable   Substance and Sexual Activity   • Alcohol use: No   • Drug use: No   • Sexual activity: Not Currently        Surgical History:   Past Surgical History:   Procedure Laterality Date   • APPENDECTOMY  2016   • CHOLECYSTECTOMY OPEN  2016   • ENDOSCOPY  04/15/2022   • ESOPHAGEAL MOTILITY STUDY N/A 2022    Procedure: MANOMETRY;  Surgeon: Royal Johnson MD;  Location: [facility];  Service: Gastroenterology;  Laterality: N/A;  Patient refused manometry probe immediately upon advancement and refused to try again, patient stated it was too painful.   • HERNIA REPAIR     • MANDIBLE FRACTURE SURGERY      jaw broke and reset   • TOTAL ABDOMINAL HYSTERECTOMY WITH SALPINGO OOPHORECTOMY Bilateral 2016    Ovarian cancer debulking   •  UMBILICAL HERNIA REPAIR N/A 04/18/2021    Procedure: UMBILICAL HERNIA REPAIR WITH MESH;  Surgeon: Royal Johnson MD;  [facility];  Service: General;  Laterality: N/A;   • VENOUS ACCESS DEVICE (PORT) INSERTION Left 05/2016        Medical History:   Past Medical History:   Diagnosis Date   • Abdominal pain    • Anxiety    • BMI 29.0-29.9,adult     BMI > = 18 - 29.6   • Chronic narcotic use     Chronic neck pain.  Dr. Mian Somers.   • Chronic neck pain    • Current every day smoker    • Dehydration    • Drug therapy 2016   • Emesis 8/25/2016   • GERD (gastroesophageal reflux disease)    • Leukocytosis 8/25/2016   • Ovarian cancer (HCC) 04/2016    Stage 1C1 mucinous adenocarcinoma        Objective     Physical Exam:  Vital Signs: There were no vitals filed for this visit.  There is no height or weight on file to calculate BMI.     Physical Exam  Audio visit    Assessment / Plan      Assessment/Plan:   There are no diagnoses linked to this encounter.     You have chosen to receive care through a telephone visit. Do you consent to use a telephone visit for your medical care today? 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    Plan of care reviewed with the patient at the conclusion of today's visit.  Education was provided regarding diagnosis, management, and any prescribed or recommended OTC  medications.  Patient verbalized understanding of and agreement with management plan.     Time Statement:   Discussed plan of care in detail with patient today. Patient verbally understands and agrees. I have spent 30 minutes reviewing available diagnostics, obtaining history, examining the patient, developing a treatment plan, and educating the patient on disease process and plan of care.     Ellie Porras PA-C   [facility]

## 2022-08-26 DIAGNOSIS — Z12.11 SCREEN FOR COLON CANCER: Primary | ICD-10-CM

## 2022-08-26 RX ORDER — SODIUM, POTASSIUM,MAG SULFATES 17.5-3.13G
1 SOLUTION, RECONSTITUTED, ORAL ORAL TAKE AS DIRECTED
Qty: 354 ML | Refills: 0 | Status: SHIPPED | OUTPATIENT
Start: 2022-08-26

## 2022-09-14 ENCOUNTER — OUTSIDE FACILITY SERVICE (OUTPATIENT)
Dept: GASTROENTEROLOGY | Facility: CLINIC | Age: 52
End: 2022-09-14

## 2022-09-14 PROCEDURE — 88305 TISSUE EXAM BY PATHOLOGIST: CPT | Performed by: INTERNAL MEDICINE

## 2022-09-14 PROCEDURE — 43239 EGD BIOPSY SINGLE/MULTIPLE: CPT | Performed by: INTERNAL MEDICINE

## 2022-09-14 PROCEDURE — 45378 DIAGNOSTIC COLONOSCOPY: CPT | Performed by: INTERNAL MEDICINE

## 2022-09-15 ENCOUNTER — LAB REQUISITION (OUTPATIENT)
Dept: LAB | Facility: HOSPITAL | Age: 52
End: 2022-09-15

## 2022-09-15 DIAGNOSIS — R13.10 DYSPHAGIA, UNSPECIFIED: ICD-10-CM

## 2022-09-15 DIAGNOSIS — K29.70 GASTRITIS, UNSPECIFIED, WITHOUT BLEEDING: ICD-10-CM

## 2022-09-15 DIAGNOSIS — Z12.11 ENCOUNTER FOR SCREENING FOR MALIGNANT NEOPLASM OF COLON: ICD-10-CM

## 2022-09-19 DIAGNOSIS — A63.0 ANAL CONDYLOMA: Primary | ICD-10-CM

## 2022-09-21 ENCOUNTER — TELEPHONE (OUTPATIENT)
Dept: GASTROENTEROLOGY | Facility: CLINIC | Age: 52
End: 2022-09-21

## 2022-09-21 NOTE — TELEPHONE ENCOUNTER
Patient called for biopsy results. I tried to call Ms Yost back to give biopsy results. No answer; left voice message. I see that she has an appointment with Ellie on 9/28/2022.

## 2022-09-21 NOTE — TELEPHONE ENCOUNTER
Fred Thomas MD   9/16/2022  3:04 PM EDT Back to Top        The upper endoscopy biopsy was unremarkable except for some focal intestinal metaplasia in the antrum without H. pylori.  This is likely related to medication.  The colon examination did demonstrate evidence of an anal condyloma in the anal canal.  I would recommend referral to Dr. Choudhary with colorectal surgery.

## 2022-09-21 NOTE — TELEPHONE ENCOUNTER
I spoke with Ms Yost. Gave patient biopsy results. I explained to patient that Ellie will explained biopsy more in depth when she has her telehealth visit with her on 9/28/2022. Patient voiced understanding.

## 2022-09-28 ENCOUNTER — OFFICE VISIT (OUTPATIENT)
Dept: GASTROENTEROLOGY | Facility: CLINIC | Age: 52
End: 2022-09-28

## 2022-09-28 DIAGNOSIS — R13.19 ESOPHAGEAL DYSPHAGIA: Primary | ICD-10-CM

## 2022-09-28 DIAGNOSIS — A63.0 ANAL CONDYLOMA: ICD-10-CM

## 2022-09-28 PROCEDURE — 99443 PR PHYS/QHP TELEPHONE EVALUATION 21-30 MIN: CPT | Performed by: PHYSICIAN ASSISTANT

## 2022-09-28 RX ORDER — HYOSCYAMINE SULFATE 0.125 MG
0.12 TABLET ORAL
Qty: 120 TABLET | Refills: 5 | Status: SHIPPED | OUTPATIENT
Start: 2022-09-28

## 2022-09-28 NOTE — PROGRESS NOTES
Follow Up      Patient Name: Gertrudis Yost  : 1970   MRN: [id number]     Chief Complaint:  No chief complaint on file.      History of Present Illness: Gertrudis Yost is a 52 y.o. female who is here today for post procedure follow up.    EGD / CSY  with Dr. Thomas. Normal upper and middle third of esophagus. Z-line regular, 40cm from incisors. Balloon dilator to 20mm performed. Mild gastritis in gastric body and gastric antrum. Normal duodenum. Bx negative for H Pylori or dysplasia, celiac disease. Focal intestinal metaplasia in antrum. Good bowel prep conditions. Normal terminal ileum. Normal appearing colon. Nonbleeding internal hemorrhoids. 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     Pt notes improvement of dysphagia following dilation. She continues to experience esophageal dysphagia and bolus sensation daily, but not as severe or frequent as before. She denies specific aggravating or alleviating foods or factors.     She has an appt to see [facility] for evaluation of anal condyloma next month.     Patient denies associated fever, chills, abdominal pain, indigestion, nausea, vomiting, diarrhea, constipation, hematemesis, hematochezia, melena, bloating, weight loss or gain, dysuria, jaundice or bruising.    Subjective      Review of Systems:   Review of Systems   Constitutional: Negative for appetite change, chills, diaphoresis, fatigue, fever, unexpected weight gain and unexpected weight loss.   HENT: Positive for trouble swallowing. Negative for drooling, facial swelling, mouth sores, rhinorrhea, sore throat, tinnitus and voice change.    Eyes: Negative.    Respiratory: Negative for cough, chest tightness and shortness of breath.    Cardiovascular: Negative for chest pain.   Gastrointestinal: Negative for abdominal pain, blood in stool, constipation, diarrhea, nausea, vomiting, GERD and indigestion.   Genitourinary: Negative for dysuria, flank pain, hematuria and pelvic pain.    Musculoskeletal: Negative for arthralgias and myalgias.   Skin: Negative for color change, pallor and rash.   Neurological: Negative for dizziness, tremors, syncope, weakness and numbness.   Psychiatric/Behavioral: Negative for hallucinations and sleep disturbance. The patient is not nervous/anxious.    All other systems reviewed and are negative.      Medications:     Current Outpatient Medications:   •  gabapentin (NEURONTIN) 400 MG capsule, As Needed., Disp: , Rfl:   •  omeprazole (priLOSEC) 40 MG capsule, Take 1 capsule by mouth Daily., Disp: 30 capsule, Rfl: 5  •  oxyCODONE-acetaminophen (PERCOCET)  MG per tablet, Take 1 tablet by mouth every 6 (six) hours as needed for moderate pain (4-6)., Disp: , Rfl:   •  Sodium Sulfate-Mag Sulfate-KCl 9948-526-401 MG tablet, Take 1 kit by mouth Take As Directed., Disp: 24 tablet, Rfl: 0  •  sodium-potassium-magnesium sulfates (Suprep Bowel Prep Kit) 17.5-3.13-1.6 GM/177ML solution oral solution, Take 1 bottle by mouth Take As Directed. Follow instructions that were mailed to your home. If you didn't receive these call [phone]., Disp: 354 mL, Rfl: 0    Allergies:   No Known Allergies    Social History:   Social History     Socioeconomic History   • Marital status: Single   • Number of children: 1   Tobacco Use   • Smoking status: Former Smoker     Packs/day: 0.50     Years: 20.00     Pack years: 10.00     Types: Cigarettes     Quit date: 2022     Years since quittin.6   • Smokeless tobacco: Never Used   • Tobacco comment: not ready to quit vaping   Vaping Use   • Vaping Use: Every day   • Start date: 2022   • Substances: Nicotine, Flavoring   • Devices: Disposable   Substance and Sexual Activity   • Alcohol use: No   • Drug use: No   • Sexual activity: Not Currently        Surgical History:   Past Surgical History:   Procedure Laterality Date   • APPENDECTOMY  2016   • CHOLECYSTECTOMY OPEN  2016   • ENDOSCOPY  04/15/2022   • ESOPHAGEAL  MOTILITY STUDY N/A 6/23/2022    Procedure: MANOMETRY;  Surgeon: Royal Johnson MD;  Location:  [facility];  Service: Gastroenterology;  Laterality: N/A;  Patient refused manometry probe immediately upon advancement and refused to try again, patient stated it was too painful.   • HERNIA REPAIR  2021   • MANDIBLE FRACTURE SURGERY      jaw broke and reset   • TOTAL ABDOMINAL HYSTERECTOMY WITH SALPINGO OOPHORECTOMY Bilateral 04/2016    Ovarian cancer debulking   • UMBILICAL HERNIA REPAIR N/A 04/18/2021    Procedure: UMBILICAL HERNIA REPAIR WITH MESH;  Surgeon: Royal Johnson MD;  Location:  [facility];  Service: General;  Laterality: N/A;   • VENOUS ACCESS DEVICE (PORT) INSERTION Left 05/2016        Medical History:   Past Medical History:   Diagnosis Date   • Abdominal pain    • Anxiety    • BMI 29.0-29.9,adult     BMI > = 18 - 29.6   • Chronic narcotic use     Chronic neck pain.  Dr. Mian Somers.   • Chronic neck pain    • Current every day smoker    • Dehydration    • Drug therapy 2016   • Emesis 8/25/2016   • GERD (gastroesophageal reflux disease)    • Leukocytosis 8/25/2016   • Ovarian cancer (HCC) 04/2016    Stage 1C1 mucinous adenocarcinoma        Objective     Physical Exam:  Vital Signs: There were no vitals filed for this visit.  There is no height or weight on file to calculate BMI.     Physical Exam  Audio visit    Assessment / Plan      Assessment/Plan:   There are no diagnoses linked to this encounter.     You have chosen to receive care through a telephone visit. Do you consent to use a telephone visit for your medical care today? Yes    Esophageal dysphagia  Anal condyloma   - continue omeprazole 40mg daily   - trial levsin QID AC sent to pharmacy   - pt given GERD diet instructions, advised to avoid GI irritants such as caffeine, carbonation, EtOH, tobacco, chocolate, peppermint, acid-based foods   - pt advised to chew food thoroughly and slowly, and to take sips of water between bites   -  previous endoscopy and pathology reports reviewed   - keep appt with [facility] as scheduled   - schedule for EGD with dilated PRN persistent sx   - follow up in clinic in 3mo, or after completion of above studies   - call clinic at any time for questions or new / worsened sx    Follow Up:   Return in about 3 months (around 12/28/2022).    Plan of care reviewed with the patient at the conclusion of today's visit.  Education was provided regarding diagnosis, management, and any prescribed or recommended OTC medications.  Patient verbalized understanding of and agreement with management plan.     NOTE TO PATIENT: The 21st Century Cures Act makes medical notes like these available to patients in the interest of transparency. However, be advised this is a medical document. It is intended as peer to peer communication. It is written in medical language and may contain abbreviations or verbiage that are unfamiliar. It may appear blunt or direct. Medical documents are intended to carry relevant information, facts as evident, and the clinical opinion of the practitioner.     Time Statement:   Discussed plan of care in detail with patient today. Patient verbally understands and agrees. I have spent 30 minutes reviewing available diagnostics, obtaining history, examining the patient, developing a treatment plan, and educating the patient on disease process and plan of care.     Ellie Porras PA-C   [facility]

## 2022-10-18 ENCOUNTER — TELEPHONE (OUTPATIENT)
Dept: GYNECOLOGIC ONCOLOGY | Facility: CLINIC | Age: 52
End: 2022-10-18

## 2022-10-18 NOTE — TELEPHONE ENCOUNTER
Caller: Gertrudis Yost    Relationship to patient: Self    Best call back number: [phone]    Chief complaint: PATIENT TO RESCHEDULE 10/21/22 APPTS    Type of visit: FU    Requested date: LATER SAME DAY

## 2022-11-08 ENCOUNTER — LAB (OUTPATIENT)
Dept: LAB | Facility: HOSPITAL | Age: 52
End: 2022-11-08

## 2022-11-08 ENCOUNTER — OFFICE VISIT (OUTPATIENT)
Dept: GYNECOLOGIC ONCOLOGY | Facility: CLINIC | Age: 52
End: 2022-11-08

## 2022-11-08 VITALS
RESPIRATION RATE: 16 BRPM | DIASTOLIC BLOOD PRESSURE: 81 MMHG | WEIGHT: 200.2 LBS | HEART RATE: 58 BPM | BODY MASS INDEX: 34.1 KG/M2 | OXYGEN SATURATION: 98 % | SYSTOLIC BLOOD PRESSURE: 133 MMHG | TEMPERATURE: 97.3 F

## 2022-11-08 DIAGNOSIS — Z12.31 SCREENING MAMMOGRAM FOR BREAST CANCER: ICD-10-CM

## 2022-11-08 DIAGNOSIS — Z91.89 AT HIGH RISK FOR BREAST CANCER: ICD-10-CM

## 2022-11-08 DIAGNOSIS — Z15.02 MONOALLELIC MUTATION OF CHEK2 GENE IN FEMALE PATIENT: ICD-10-CM

## 2022-11-08 DIAGNOSIS — Z15.09 MONOALLELIC MUTATION OF CHEK2 GENE IN FEMALE PATIENT: ICD-10-CM

## 2022-11-08 DIAGNOSIS — Z85.43 HISTORY OF OVARIAN CANCER: Primary | ICD-10-CM

## 2022-11-08 DIAGNOSIS — Z85.43 HISTORY OF OVARIAN CANCER: ICD-10-CM

## 2022-11-08 DIAGNOSIS — Z15.89 MONOALLELIC MUTATION OF CHEK2 GENE IN FEMALE PATIENT: ICD-10-CM

## 2022-11-08 DIAGNOSIS — Z15.01 MONOALLELIC MUTATION OF CHEK2 GENE IN FEMALE PATIENT: ICD-10-CM

## 2022-11-08 PROCEDURE — 36415 COLL VENOUS BLD VENIPUNCTURE: CPT

## 2022-11-08 PROCEDURE — 99213 OFFICE O/P EST LOW 20 MIN: CPT | Performed by: NURSE PRACTITIONER

## 2022-11-08 PROCEDURE — 86304 IMMUNOASSAY TUMOR CA 125: CPT

## 2022-11-08 RX ORDER — GABAPENTIN 300 MG/1
CAPSULE ORAL
COMMUNITY
Start: 2022-10-12

## 2022-11-08 NOTE — PROGRESS NOTES
GYN ONCOLOGY CANCER SURVEILLANCE FOLLOW-UP    Gertrudis Yost  [id number]  1970    Subjective   Chief Complaint: Ovarian Cancer        History of present illness:     Gertrudis Yost is a 52 y.o. year old female who is here today for ongoing surveillance of Ovarian Cancer, see Cancer History. She is 6 years out from completion of treatment. CA-125 has been low/stable, due to repeat today.        Gertrudis tested positive for CHEK2 mutation and is followed for high risk status as well. This has been estimated to increase the risk of female breast cancer to somewhere between 24-48% depending on family history.  There is also a possible increased risk for colorectal cancer in both men and women up to 9.5%. Mammogram and breast MRI are both overdue. She has completed colonoscopy since our last visit, with Dr. Thomas on 9/14/2022. 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     She reports she is feeling very well today and has no complaints. She denies vaginal bleeding, pelvic pain, abdominal fullness/bloating, appetite changes, and changes in bowel or bladder function. She declines clinical breast exam today, but does request order for next mammogram.          Cancer History:   Oncology/Hematology History   Ovarian cancer (HCC)   4/2016 Imaging    20 cm right pelvic mass seen upon CT during work-up for abdominal pain with leukocytosis and emesis. Referred to Gyn Oncology. CA-125 = 147 and CEA = 1.9     4/11/2016 Surgery    Laparotomy and debulking with Dr. Roman along with cholecystectomy by Dr. Leslie.   Cancer staging surgery included MORE/BSO, bilateral pelvic and periaortic lymph node dissection, omentectomy, appendectomy, and peritoneal stripping. There was intra-operative spill of the right ovarian mass during surgery. 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   Washings negative for malignant cells.  No LVSI, no ovarian surface involvement, omentum negative, and all sampled nodes negative.  Due to intraoperative rupture, final Stage ICI grade 2     5/2/2016 Surgery    Port-A-Cath placement       5/11/2016 - 8/31/2016 Chemotherapy    Carbo/Taxol every 3 weeks × 6 cycles     10/25/2016 Imaging    Posttreatment CT scan showed only a small node in the left external iliac chain not meeting criteria for malignant adenopathy, unchanged from preop scan.  Study otherwise negative.     9/7/2017 Surgery    Port-A-Cath removal       9/2017 Genetic Testing    Genetic testing was positive for CHEK2 gene (c.470T>C)           The current medication list and allergy list were reviewed and reconciled.     Past Medical History, Past Surgical History, Social History, Family History have been reviewed and are without significant changes except as mentioned.      Review of Systems   Constitutional: Negative.    Gastrointestinal: Negative.    Genitourinary: Negative.    Skin:        Breast ROS negative.          Objective   Physical Exam  Vital Signs: /81   Pulse 58   Temp 97.3 °F (36.3 °C)   Resp 16   Wt 90.8 kg (200 lb 3.2 oz)   SpO2 98%   BMI 34.10 kg/m²   Vitals:    11/08/22 1411   PainSc: 0-No pain           General Appearance:  alert, cooperative, no apparent distress and appears stated age   Neurologic/Psychiatric: A&O x 3, gait steady, appropriate affect   HEENT:  Normocephalic, without obvious abnormality, mucous membranes moist   Breast: deferred   Abdomen:   Soft, non-tender, non-distended, no organomegaly and scarring consistent with surgical history   Lymph nodes: No cervical, supraclavicular, inguinal adenopathy noted   Pelvic: External Genitalia  without lesions or skin changes  Vagina  is pink, moist, without lesions.   Vaginal Cuff  Female Vaginal Cuff: smooth, intact and without visible lesions  Uterus  surgically absent  Ovaries  surgically absent bilaterally and  without palpable masses or fullness  Parametria  smooth  Rectovaginal  Female rectovaginal: deferred     ECOG score: 0             Result Review :   Tumor marker:     Date Value Ref Range Status   04/22/2022 13.9 0.0 - 38.1 U/mL Final   08/25/2021 14.5 0.0 - 38.1 U/mL Final   05/14/2021 17.7 0.0 - 38.1 U/mL Final   11/24/2020 13.2 0.0 - 38.1 U/mL Final                Assessment and Plan:    Diagnoses and all orders for this visit:    1. History of ovarian cancer (Primary)  -     ; Future    2. Screening mammogram for breast cancer  -     Mammo Screening Digital Tomosynthesis Bilateral With CAD; Future    3. Monoallelic mutation of CHEK2 gene in female patient  -     Mammo Screening Digital Tomosynthesis Bilateral With CAD; Future    4. At high risk for breast cancer  -     Mammo Screening Digital Tomosynthesis Bilateral With CAD; Future          There is no evidence of disease upon today's exam. She will be notified of CA-125 results upon their return. She is understanding to call with any changes in pelvic symptoms or general GYN concerns at any time between regularly scheduled visits.     Reviewed screening NCCN recommended screening schedule for CHEK2+ as follows:  Monthly breast exams starting at age 18.   Clinical breast exam every 6 months.  Annual mammogram starting at age 40, or earlier based upon family history.  Annual breast MRI starting at age 40.  Consider chemoprevention for breast cancer risk reduction (Tamoxifen or Raloxifene).  Consider risk-reducing mastectomy.  Colonoscopies every 5 years starting at age 40, or earlier based upon family history.    Discussed the purpose of high risk clinic in coordinating, scheduling, and planning screening interventions to include yearly mammograms, yearly screening breast MRI, Q6 month CBE, and monthly SBE. Colonoscopy every 5 years is recommended, repeat in 2027 or sooner if new problems. GYN screening per national guidelines.   Reviewed technique  for SBE including concerning findings to report.  Repeat mammogram overdue, order placed today so she will be called to schedule.  Discussed tamoxifen/evista use for chemoprevention including risk and benefits of use as well as side effects of medication-pt declines at this time  Discussed screening breast MRI scheduling based on menstrual cycles, HRT. We also discussed the increased sensitivity of MRI screening and possibility of call-backs for additional imaging or biopsies to establish baseline. Repeat MRI overdue, will order with next visit.   Discussed vitamin D as benefit to bone health and possible benefit to breast health. Recommended 1000 IU daily unless contraindicated  Discussed benefit of Vitamin E for fibrocystic breasts/breast pain.      Pain assessment was performed today as a part of patient’s care.  For patients with pain related to surgery, gynecologic malignancy or cancer treatment, the plan is as noted in the assessment/plan.  For patients with pain not related to these issues, they are to seek any further needed care from a more appropriate provider, such as PCP.        Follow-up:     Return to clinic in 6 months for High Risk visit and ongoing cancer surveillance.      Electronically signed by NELSON Stone on 11/08/22 at 14:44 EST

## 2022-11-09 ENCOUNTER — TELEPHONE (OUTPATIENT)
Dept: GYNECOLOGIC ONCOLOGY | Facility: CLINIC | Age: 52
End: 2022-11-09

## 2022-11-09 LAB — CANCER AG125 SERPL QL: 17.9 U/ML (ref 0–38.1)

## 2022-12-28 ENCOUNTER — HOSPITAL ENCOUNTER (OUTPATIENT)
Dept: MAMMOGRAPHY | Facility: HOSPITAL | Age: 52
Discharge: HOME | End: 2022-12-28
Admitting: NURSE PRACTITIONER

## 2022-12-28 DIAGNOSIS — Z91.89 AT HIGH RISK FOR BREAST CANCER: ICD-10-CM

## 2022-12-28 DIAGNOSIS — Z15.09 MONOALLELIC MUTATION OF CHEK2 GENE IN FEMALE PATIENT: ICD-10-CM

## 2022-12-28 DIAGNOSIS — Z15.01 MONOALLELIC MUTATION OF CHEK2 GENE IN FEMALE PATIENT: ICD-10-CM

## 2022-12-28 DIAGNOSIS — Z12.31 SCREENING MAMMOGRAM FOR BREAST CANCER: ICD-10-CM

## 2022-12-28 DIAGNOSIS — Z15.89 MONOALLELIC MUTATION OF CHEK2 GENE IN FEMALE PATIENT: ICD-10-CM

## 2022-12-28 DIAGNOSIS — Z15.02 MONOALLELIC MUTATION OF CHEK2 GENE IN FEMALE PATIENT: ICD-10-CM

## 2022-12-28 PROCEDURE — 77067 SCR MAMMO BI INCL CAD: CPT

## 2022-12-28 PROCEDURE — 77067 SCR MAMMO BI INCL CAD: CPT | Performed by: RADIOLOGY

## 2022-12-28 PROCEDURE — 77063 BREAST TOMOSYNTHESIS BI: CPT | Performed by: RADIOLOGY

## 2022-12-28 PROCEDURE — 77063 BREAST TOMOSYNTHESIS BI: CPT

## 2023-05-09 ENCOUNTER — OFFICE VISIT (OUTPATIENT)
Dept: GYNECOLOGIC ONCOLOGY | Facility: CLINIC | Age: 53
End: 2023-05-09
Payer: COMMERCIAL

## 2023-05-09 ENCOUNTER — LAB (OUTPATIENT)
Dept: LAB | Facility: HOSPITAL | Age: 53
End: 2023-05-09
Payer: COMMERCIAL

## 2023-05-09 VITALS
SYSTOLIC BLOOD PRESSURE: 138 MMHG | BODY MASS INDEX: 33.86 KG/M2 | RESPIRATION RATE: 18 BRPM | OXYGEN SATURATION: 97 % | HEART RATE: 62 BPM | DIASTOLIC BLOOD PRESSURE: 86 MMHG | WEIGHT: 198.3 LBS | TEMPERATURE: 97.1 F | HEIGHT: 64 IN

## 2023-05-09 DIAGNOSIS — Z15.09 MONOALLELIC MUTATION OF CHEK2 GENE IN FEMALE PATIENT: ICD-10-CM

## 2023-05-09 DIAGNOSIS — Z85.43 HISTORY OF OVARIAN CANCER: Primary | ICD-10-CM

## 2023-05-09 DIAGNOSIS — Z91.89 AT HIGH RISK FOR BREAST CANCER: ICD-10-CM

## 2023-05-09 DIAGNOSIS — Z15.89 MONOALLELIC MUTATION OF CHEK2 GENE IN FEMALE PATIENT: ICD-10-CM

## 2023-05-09 DIAGNOSIS — Z15.02 MONOALLELIC MUTATION OF CHEK2 GENE IN FEMALE PATIENT: ICD-10-CM

## 2023-05-09 DIAGNOSIS — Z15.01 MONOALLELIC MUTATION OF CHEK2 GENE IN FEMALE PATIENT: ICD-10-CM

## 2023-05-09 DIAGNOSIS — Z85.43 HISTORY OF OVARIAN CANCER: ICD-10-CM

## 2023-05-09 LAB — CANCER AG125 SERPL QL: 14 U/ML (ref 0–38.1)

## 2023-05-09 PROCEDURE — 86304 IMMUNOASSAY TUMOR CA 125: CPT

## 2023-05-09 PROCEDURE — 36415 COLL VENOUS BLD VENIPUNCTURE: CPT

## 2023-05-09 NOTE — PROGRESS NOTES
GYN ONCOLOGY CANCER SURVEILLANCE FOLLOW-UP    Gertrudis Yost  [id number]  1970    Subjective   Chief Complaint: Ovarian Cancer and Follow-up (High risk CHEK2+)        History of present illness:     Gertrudis Yost is a 52 y.o. year old female who is here today for ongoing surveillance of Ovarian Cancer, see Cancer History, and high risk visit. She will reach 7 years out from completion of treatment later this summer. CA-125 has been low/stable, due to repeat today. She reports she is feeling very well today and has no complaints. She denies vaginal bleeding, pelvic pain, changes in bowel or bladder function, new or concerning lesions, and breast problems.    Gertrudis has tested positive for CHEK2 mutation and is followed for high risk status as well. This has been estimated to increase the risk of female breast cancer to somewhere between 24-48% depending on family history.  There is also a possible increased risk for colorectal cancer in both men and women up to 9.5%. Mammogram is UTD, high risk breast MRI scheduled for next month, and colonoscopy UTD last 9/14/2022.        Cancer History:   Oncology/Hematology History   Ovarian cancer   4/2016 Imaging    20 cm right pelvic mass seen upon CT during work-up for abdominal pain with leukocytosis and emesis. Referred to Gyn Oncology. CA-125 = 147 and CEA = 1.9     4/11/2016 Surgery    Laparotomy and debulking with Dr. Roman along with cholecystectomy by Dr. Leslie.   Cancer staging surgery included MORE/BSO, bilateral pelvic and periaortic lymph node dissection, omentectomy, appendectomy, and peritoneal stripping. There was intra-operative spill of the right ovarian mass during surgery. 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  Washings negative for malignant cells.  No LVSI, no ovarian surface involvement, omentum negative, and all sampled nodes negative.  Due to intraoperative rupture, final  "Stage ICI grade 2     5/2/2016 Surgery    Port-A-Cath placement       5/11/2016 - 8/31/2016 Chemotherapy    Carbo/Taxol every 3 weeks × 6 cycles     10/25/2016 Imaging    Posttreatment CT scan showed only a small node in the left external iliac chain not meeting criteria for malignant adenopathy, unchanged from preop scan.  Study otherwise negative.     9/7/2017 Surgery    Port-A-Cath removal       9/2017 Genetic Testing    Genetic testing was positive for CHEK2 gene (c.470T>C)           The current medication list and allergy list were reviewed and reconciled.     Past Medical History, Past Surgical History, Social History, Family History have been reviewed and are without significant changes except as mentioned.      Health Maintenance:    Regular self breast exam: yes  Mammogram: 12/28/2022. History of abnormal mammogram: no  Breast MRI: 3/9/2021. Results: negative  Colonoscopy: 9/14/2022    Risk Asessment: Genetic Testing: CHEK2+    Review of Systems   Constitutional: Negative.    Gastrointestinal: Negative.    Genitourinary: Negative.    Skin:        Breast ROS negative.          Objective   Physical Exam  Vital Signs: /86   Pulse 62   Temp 97.1 °F (36.2 °C) (Temporal)   Resp 18   Ht 163.2 cm (64.25\")   Wt 89.9 kg (198 lb 4.8 oz)   SpO2 97%   BMI 33.77 kg/m²   Vitals:    05/09/23 1010   PainSc: 0-No pain           General Appearance:  alert, cooperative, no apparent distress and appears stated age   Neurologic/Psychiatric: A&O x 3, gait steady, appropriate affect   HEENT:  Normocephalic, without obvious abnormality, mucous membranes moist   Breast: Symmetric, no masses, no lesions and no nipple discharge   Abdomen:   Soft, non-tender, non-distended, no organomegaly and scarring consistent with surgical history   Lymph nodes: No cervical, supraclavicular, inguinal adenopathy noted   Pelvic: External Genitalia  without lesions or skin changes  Vagina  is pink, moist, without lesions.   Vaginal Cuff  " Female Vaginal Cuff: smooth, intact and without visible lesions  Uterus  surgically absent  Ovaries  surgically absent bilaterally and without palpable masses or fullness  Parametria  smooth  Rectovaginal  Female rectovaginal: deferred     ECOG score: 0             Result Review :   Tumor marker:     Date Value Ref Range Status   11/08/2022 17.9 0.0 - 38.1 U/mL Final   04/22/2022 13.9 0.0 - 38.1 U/mL Final   08/25/2021 14.5 0.0 - 38.1 U/mL Final   05/14/2021 17.7 0.0 - 38.1 U/mL Final                Assessment and Plan:    Diagnoses and all orders for this visit:    1. History of ovarian cancer (Primary)  -     ; Future    2. Monoallelic mutation of CHEK2 gene in female patient    3. At high risk for breast cancer          There is no evidence of disease upon today's exam. She will be notified of CA-125 results upon their return. Plan to continue yearly pelvic exams and CA-125 assessments. She is understanding to call with any changes in pelvic symptoms or general GYN concerns at any time between regularly scheduled visits.     Reviewed screening NCCN recommended screening schedule for CHEK2+ as follows:  Monthly breast exams starting at age 18.   Clinical breast exam every 6 months.  Annual mammogram starting at age 40, or earlier based upon family history.  Annual breast MRI starting at age 40.  Consider chemoprevention for breast cancer risk reduction (Tamoxifen or Raloxifene).  Consider risk-reducing mastectomy.  Colonoscopies every 5 years starting at age 40, or earlier based upon family history.    Discussed the purpose of high risk clinic in coordinating, scheduling, and planning screening interventions to include yearly mammograms, yearly screening breast MRI, Q6 month CBE, and monthly SBE. Colonoscopy every 5 years is recommended. GYN screening per national guidelines.   Reviewed technique for SBE including concerning findings to report.  Repeat mammogram due in 12/2023.  Discussed  tamoxifen/evista use for chemoprevention including risk and benefits of use as well as side effects of medication-pt declines at this time  Discussed screening breast MRI scheduling based on menstrual cycles, HRT. We also discussed the increased sensitivity of MRI screening and possibility of call-backs for additional imaging or biopsies to establish baseline. Repeat MRI scheduled for next month. Patient would like to have MRIs every other year if stable.   Discussed vitamin D as benefit to bone health and possible benefit to breast health. Recommended 1000 IU daily unless contraindicated  Discussed benefit of Vitamin E for fibrocystic breasts/breast pain.  Colonoscopy UTD, repeat in 2027 or sooner if new problems.    Pain assessment was performed today as a part of patient’s care.  For patients with pain related to surgery, gynecologic malignancy or cancer treatment, the plan is as noted in the assessment/plan.  For patients with pain not related to these issues, they are to seek any further needed care from a more appropriate provider, such as PCP.      Follow-up:     Return to clinic in 6 months for High Risk visit.      Electronically signed by NELSON Stone on 05/09/23 at 10:40 EDT

## 2023-06-07 ENCOUNTER — HOSPITAL ENCOUNTER (OUTPATIENT)
Dept: MRI IMAGING | Facility: HOSPITAL | Age: 53
Discharge: HOME | End: 2023-06-07
Admitting: NURSE PRACTITIONER
Payer: COMMERCIAL

## 2023-06-07 DIAGNOSIS — Z91.89 AT HIGH RISK FOR BREAST CANCER: ICD-10-CM

## 2023-06-07 PROCEDURE — 0 GADOBENATE DIMEGLUMINE 529 MG/ML SOLUTION: Performed by: NURSE PRACTITIONER

## 2023-06-07 PROCEDURE — A9577 INJ MULTIHANCE: HCPCS | Performed by: NURSE PRACTITIONER

## 2023-06-07 PROCEDURE — 77049 MRI BREAST C-+ W/CAD BI: CPT

## 2023-06-07 RX ADMIN — GADOBENATE DIMEGLUMINE 19 ML: 529 INJECTION, SOLUTION INTRAVENOUS at 10:12

## 2023-11-15 ENCOUNTER — OFFICE VISIT (OUTPATIENT)
Dept: ONCOLOGY | Facility: CLINIC | Age: 53
End: 2023-11-15
Payer: COMMERCIAL

## 2023-11-15 VITALS
WEIGHT: 198.4 LBS | TEMPERATURE: 97.3 F | HEART RATE: 58 BPM | BODY MASS INDEX: 33.79 KG/M2 | SYSTOLIC BLOOD PRESSURE: 138 MMHG | OXYGEN SATURATION: 99 % | DIASTOLIC BLOOD PRESSURE: 85 MMHG

## 2023-11-15 DIAGNOSIS — Z85.43 HISTORY OF OVARIAN CANCER: ICD-10-CM

## 2023-11-15 DIAGNOSIS — Z12.31 BREAST CANCER SCREENING BY MAMMOGRAM: ICD-10-CM

## 2023-11-15 DIAGNOSIS — Z15.02 MONOALLELIC MUTATION OF CHEK2 GENE IN FEMALE PATIENT: Primary | ICD-10-CM

## 2023-11-15 DIAGNOSIS — Z15.01 MONOALLELIC MUTATION OF CHEK2 GENE IN FEMALE PATIENT: Primary | ICD-10-CM

## 2023-11-15 DIAGNOSIS — Z15.09 MONOALLELIC MUTATION OF CHEK2 GENE IN FEMALE PATIENT: Primary | ICD-10-CM

## 2023-11-15 DIAGNOSIS — Z91.89 AT HIGH RISK FOR BREAST CANCER: ICD-10-CM

## 2023-11-15 DIAGNOSIS — Z15.89 MONOALLELIC MUTATION OF CHEK2 GENE IN FEMALE PATIENT: Primary | ICD-10-CM

## 2023-11-15 PROCEDURE — 99213 OFFICE O/P EST LOW 20 MIN: CPT | Performed by: NURSE PRACTITIONER

## 2023-11-15 PROCEDURE — 1126F AMNT PAIN NOTED NONE PRSNT: CPT | Performed by: NURSE PRACTITIONER

## 2023-11-15 NOTE — PROGRESS NOTES
CANCER RISK MANAGEMENT CLINIC     Gertrudis Yost  [id number]  1970    Subjective   Chief Complaint: Follow-up (High risk CHEK2+)      HISTORY OF PRESENT ILLNESS:       Gertrudis Yost is a 53 y.o. year old female here today for her high risk visit. She has a personal history of ovarian cancer, over 7 years out from completion of treatment. She continues yearly pelvic exams and CA-125 checks, last negative in 5/2023. She denies any new or concerning pelvic problems.   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 She has been followed for high risk status since that time. This has been estimated to increase the risk of female breast cancer to somewhere between 24-48% depending on family history.  There is also a possible increased risk for colorectal cancer in both men and women up to 9.5%. Colonoscopy UTD. Mammogram and high risk MRI UTD, mammogram due to repeat next month. She performs regular home self breast exams, denies any new or concerning findings. No GI changes.     The current medication list and allergy list were reviewed and reconciled.     Past Medical History, Past Surgical History, Social History, Family History have been reviewed and are without significant changes except as mentioned.    Health Maintenance:    Regular self breast exam: yes  Mammogram: 12/28/2022. History of abnormal mammogram: no  Breast MRI: 6/7/2023. Results: negative  Colonoscopy: 9/14/2022    Risk Asessment: Genetic Testing: CHEK2+      Review of Systems   Constitutional: Negative.    Gastrointestinal: Negative.    Genitourinary: Negative.    Psychiatric/Behavioral: Negative.         Objective   Physical Exam  Vital Signs: /85   Pulse 58   Temp 97.3 °F (36.3 °C) (Temporal)   Wt 90 kg (198 lb 6.4 oz)   SpO2 99%   BMI 33.79 kg/m²   Vitals:    11/15/23 1010   PainSc: 0-No pain           General Appearance:  alert,  cooperative, no apparent distress and appears stated age   Neurologic/Psych: A&O x 3, gait steady, appropriate affect   Breasts:  Symmetrical, no masses, no lesions, and no nipple discharge   Abdomen:   Soft, non-tender, non-distended, and no organomegaly   Lymph nodes: No axillary adenopathy noted   Skin: No rashes, ulcers, or suspicious lesions noted   Pelvic: deferred       Result Review :   The following data was reviewed by: NELSON Stone on 11/15/2023:  Data reviewed : Radiologic studies breast MRI 6/2023        Procedure Note:              Assessment and Plan:    Diagnoses and all orders for this visit:    1. 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 At high risk for breast cancer    3. History of ovarian cancer    4. Breast cancer screening by mammogram  -     Mammo Screening Digital Tomosynthesis Bilateral With CAD; Future        Patient Education:  Reviewed screening NCCN recommended screening schedule for CHEK2+ as follows:  Monthly breast exams starting at age 18.   Clinical breast exam every 6 months.  Annual mammogram starting at age 40, or earlier based upon family history.  Annual breast MRI starting at age 40.  Consider chemoprevention for breast cancer risk reduction (Tamoxifen or Raloxifene).  Consider risk-reducing mastectomy.  Colonoscopies every 5 years starting at age 40, or earlier based upon family history.     Discussed the purpose of high risk clinic in coordinating, scheduling, and planning screening interventions to include yearly mammograms, yearly screening breast MRI, Q6 month CBE, and monthly SBE. Colonoscopy every 5 years is recommended. GYN screening per national guidelines.   Reviewed technique for SBE including concerning findings to report.  Repeat mammogram due in 12/2023. Order placed today so she will be called to schedule.   Discussed screening breast MRI scheduling based on menstrual cycles, HRT. We also discussed the increased  sensitivity of MRI screening and possibility of call-backs for additional imaging or biopsies to establish baseline. Patient would like to have MRIs every other year if stable. Therefore, will repeat in 6/2025.   Discussed vitamin D as benefit to bone health and possible benefit to breast health. Recommended 1000 IU daily unless contraindicated  Discussed benefit of Vitamin E for fibrocystic breasts/breast pain.  Discussed tamoxifen/evista use for chemoprevention including risk and benefits of use as well as side effects of medication-pt declines at this time  Colonoscopy UTD, repeat in 2027 or sooner if new problems.    Pain assessment was performed today as a part of patient’s care. For patients with pain related to surgery, gynecologic malignancy or cancer treatment, the plan is as noted in the assessment/plan.  For patients with pain not related to these issues, they are to seek any further needed care from a more appropriate provider, such as PCP.              Return to clinic in 6 months for ongoing cancer surveillance and High Risk visit.      Electronically signed by NELSON Stone on 11/15/23 at 10:39 EST

## 2024-01-15 ENCOUNTER — HOSPITAL ENCOUNTER (OUTPATIENT)
Dept: MAMMOGRAPHY | Facility: HOSPITAL | Age: 54
Discharge: HOME | End: 2024-01-15
Admitting: NURSE PRACTITIONER
Payer: COMMERCIAL

## 2024-01-15 DIAGNOSIS — Z12.31 BREAST CANCER SCREENING BY MAMMOGRAM: ICD-10-CM

## 2024-01-15 PROCEDURE — 77063 BREAST TOMOSYNTHESIS BI: CPT

## 2024-01-15 PROCEDURE — 77067 SCR MAMMO BI INCL CAD: CPT | Performed by: RADIOLOGY

## 2024-01-15 PROCEDURE — 77063 BREAST TOMOSYNTHESIS BI: CPT | Performed by: RADIOLOGY

## 2024-01-15 PROCEDURE — 77067 SCR MAMMO BI INCL CAD: CPT

## 2024-04-15 ENCOUNTER — TELEPHONE (OUTPATIENT)
Dept: GYNECOLOGIC ONCOLOGY | Facility: CLINIC | Age: 54
End: 2024-04-15

## 2024-04-15 NOTE — TELEPHONE ENCOUNTER
Caller: Gertrudis Yost    Relationship: Self    Best call back number: [phone]    What is the best time to reach you: ANYTIME    Who are you requesting to speak with (clinical staff, provider,  specific staff member): SCHEDULING     What was the call regarding: PT RETURNING MISSED CALL TO [facility]

## 2024-04-15 NOTE — TELEPHONE ENCOUNTER
Caller: Gertrudis Yost    Relationship to patient: Self    Best call back number: [phone]    Type of visit: F/U APPT    Requested date: 5/28    If rescheduling, when is the original appointment: 5/15/24

## 2024-06-23 ENCOUNTER — HOSPITAL ENCOUNTER (EMERGENCY)
Facility: HOSPITAL | Age: 54
Discharge: HOME | End: 2024-06-23
Attending: FAMILY MEDICINE | Admitting: EMERGENCY MEDICINE
Payer: COMMERCIAL

## 2024-06-23 VITALS
DIASTOLIC BLOOD PRESSURE: 87 MMHG | HEIGHT: 66 IN | HEART RATE: 73 BPM | OXYGEN SATURATION: 100 % | BODY MASS INDEX: 32.14 KG/M2 | WEIGHT: 200 LBS | SYSTOLIC BLOOD PRESSURE: 163 MMHG | RESPIRATION RATE: 20 BRPM | TEMPERATURE: 97.6 F

## 2024-06-23 DIAGNOSIS — M54.50 ACUTE BILATERAL LOW BACK PAIN WITHOUT SCIATICA: Primary | ICD-10-CM

## 2024-06-23 LAB
BILIRUB UR QL STRIP: NEGATIVE
CLARITY UR: CLEAR
COLOR UR: YELLOW
GLUCOSE UR STRIP-MCNC: NEGATIVE MG/DL
HGB UR QL STRIP.AUTO: NEGATIVE
KETONES UR QL STRIP: NEGATIVE
LEUKOCYTE ESTERASE UR QL STRIP.AUTO: NEGATIVE
NITRITE UR QL STRIP: NEGATIVE
PH UR STRIP.AUTO: 5.5 [PH] (ref 5–8)
PROT UR QL STRIP: NEGATIVE
SP GR UR STRIP: >=1.03 (ref 1–1.03)
UROBILINOGEN UR QL STRIP: NORMAL

## 2024-06-23 PROCEDURE — 81003 URINALYSIS AUTO W/O SCOPE: CPT

## 2024-06-23 PROCEDURE — 99283 EMERGENCY DEPT VISIT LOW MDM: CPT

## 2024-06-23 RX ORDER — KETOROLAC TROMETHAMINE 30 MG/ML
30 INJECTION, SOLUTION INTRAMUSCULAR; INTRAVENOUS ONCE
Status: DISCONTINUED | OUTPATIENT
Start: 2024-06-23 | End: 2024-06-23

## 2024-06-23 RX ORDER — CYCLOBENZAPRINE HCL 10 MG
10 TABLET ORAL 3 TIMES DAILY PRN
Qty: 15 TABLET | Refills: 0 | Status: SHIPPED | OUTPATIENT
Start: 2024-06-23 | End: 2024-06-28

## 2024-06-23 RX ORDER — CYCLOBENZAPRINE HCL 10 MG
10 TABLET ORAL ONCE
Status: COMPLETED | OUTPATIENT
Start: 2024-06-23 | End: 2024-06-23

## 2024-06-23 RX ADMIN — CYCLOBENZAPRINE HYDROCHLORIDE 10 MG: 10 TABLET, FILM COATED ORAL at 19:43

## 2024-06-23 NOTE — FSED PROVIDER NOTE
Subjective  History of Present Illness:    Patient presents to the ED with complaints of back pain.  States that yesterday morning, patient was putting her pants on and her foot got caught on the pants.  Patient started to feel a sharp pain across her lower back.  States that when she walks, she has to bend forward to relieve pain.  Patient states that she has sitting still, patient states the pain goes away.  Patient states that she has a difficult time getting up from the sitting position.  Patient states that she fractured her back 5-6 years ago in the lumbar spine.  Patient states that she wore her back brace yesterday and today from previous injury.  Patient states that she took an oxycodone around 1730 today with no improvement.  Denies any numbness, bowel/bladder incontinence, chest pain, shortness of breath, or urinary symptoms.  Denies any other symptoms or concerns at this time.    Nurses Notes reviewed and agree, including vitals, allergies, social history and prior medical history.     REVIEW OF SYSTEMS: All systems reviewed and not pertinent unless noted.  Review of Systems   Musculoskeletal:  Positive for back pain.   All other systems reviewed and are negative.    Past Medical History:   Diagnosis Date    Abdominal pain     Anxiety     BMI 29.0-29.9,adult     BMI > = 18 - 29.6    Chronic narcotic use     Chronic neck pain.  Dr. Mian Somers.    Chronic neck pain     Current every day smoker     Dehydration     Drug therapy 2016    Emesis 8/25/2016    GERD (gastroesophageal reflux disease)     Leukocytosis 8/25/2016    Ovarian cancer 04/2016    Stage 1C1 mucinous adenocarcinoma       Allergies:    Patient has no known allergies.      Past Surgical History:   Procedure Laterality Date    APPENDECTOMY  04/2016    CHOLECYSTECTOMY OPEN  04/2016    ENDOSCOPY  04/15/2022    ESOPHAGEAL MOTILITY STUDY N/A 6/23/2022    Procedure: MANOMETRY;  Surgeon: Royal Johnson MD;  Location: [facility] ENDOSCOPY;  Service:  "Gastroenterology;  Laterality: N/A;  Patient refused manometry probe immediately upon advancement and refused to try again, patient stated it was too painful.    HERNIA REPAIR      MANDIBLE FRACTURE SURGERY      jaw broke and reset    TOTAL ABDOMINAL HYSTERECTOMY WITH SALPINGO OOPHORECTOMY Bilateral 2016    Ovarian cancer debulking    UMBILICAL HERNIA REPAIR N/A 2021    Procedure: UMBILICAL HERNIA REPAIR WITH MESH;  Surgeon: Royal Johnson MD;  Location: [facility];  Service: General;  Laterality: N/A;    VENOUS ACCESS DEVICE (PORT) INSERTION Left 2016         Social History     Socioeconomic History    Marital status: Single    Number of children: 1   Tobacco Use    Smoking status: Former     Current packs/day: 0.00     Average packs/day: 0.5 packs/day for 20.0 years (10.0 ttl pk-yrs)     Types: Cigarettes     Start date: 2002     Quit date: 2022     Years since quittin.4    Smokeless tobacco: Never    Tobacco comments:     not ready to quit vaping   Vaping Use    Vaping status: Every Day    Start date: 2022    Substances: Nicotine, Flavoring    Devices: Disposable   Substance and Sexual Activity    Alcohol use: No    Drug use: No    Sexual activity: Not Currently         Family History   Problem Relation Age of Onset    Heart disease Mother     Breast cancer Maternal Grandmother         DX AGE UNKNOWN    BRCA 1/2 Neg Hx     Colon cancer Neg Hx     Endometrial cancer Neg Hx     Ovarian cancer Neg Hx        Objective  Physical Exam:  /87   Pulse 73   Temp 97.6 °F (36.4 °C) (Oral)   Resp 20   Ht 167.6 cm (66\")   Wt 90.7 kg (200 lb)   SpO2 100%   BMI 32.28 kg/m²      Physical Exam  Constitutional:       General: She is not in acute distress.     Appearance: She is obese.   HENT:      Head: Normocephalic and atraumatic.   Cardiovascular:      Rate and Rhythm: Normal rate and regular rhythm.      Heart sounds: Normal heart sounds.   Pulmonary:      Effort: Pulmonary " effort is normal. No respiratory distress.      Breath sounds: Normal breath sounds.   Abdominal:      General: Abdomen is flat. Bowel sounds are normal.      Palpations: Abdomen is soft.      Tenderness: There is right CVA tenderness and left CVA tenderness.   Musculoskeletal:         General: No tenderness.      Cervical back: Normal range of motion and neck supple.        Back:       Comments: pain and tenderness   Skin:     General: Skin is warm and dry.   Neurological:      General: No focal deficit present.      Mental Status: She is alert and oriented to person, place, and time.   Psychiatric:         Mood and Affect: Mood normal.         Behavior: Behavior normal.       Procedures    ED Course:         Lab Results (last 24 hours)       Procedure Component Value Units Date/Time    Urinalysis With Culture If Indicated - Urine, Clean Catch [[id number]]  (Normal) Collected: 06/23/24 1950    Specimen: Urine, Clean Catch Updated: 06/23/24 1953     Color, UA Yellow     Appearance, UA Clear     pH, UA 5.5     Specific Gravity, UA >=1.030     Glucose, UA Negative     Ketones, UA Negative     Bilirubin, UA Negative     Blood, UA Negative     Protein, UA Negative     Leuk Esterase, UA Negative     Nitrite, UA Negative     Urobilinogen, UA 0.2 E.U./dL    Narrative:      In absence of clinical symptoms, the presence of pyuria, bacteria, and/or nitrites on the urinalysis result does not correlate with infection.  Urine microscopic not indicated.             No radiology results from the last 24 hrs       MDM     Amount and/or Complexity of Data Reviewed  Clinical lab tests: reviewed      Patient presented to the ED with complaints of lower back pain that started yesterday after patient was trying to put her pants on and tripped.  Patient had no spinal tenderness.  Patient complained of pain across the lower portion of her back.  Patient was given Flexeril and Toradol and her symptoms improved.  Will write patient Flexeril  outpatient.  Advised patient to follow-up with orthopedics for further evaluation and to return to ED if symptoms worsened.  Patient was agreeable to plan and discharge.    Medications   ketorolac (TORADOL) injection 30 mg (has no administration in time range)   cyclobenzaprine (FLEXERIL) tablet 10 mg (10 mg Oral Given 6/23/24 1943)     Data interpreted: Nursing notes reviewed, vital signs reviewed.  Labs independently interpreted by me (CBC, CMP, lipase, UA, troponin, ABG, lactic acid, procalcitonin).  Imaging independently interpreted by me (x-ray, CT scan).  EKG independently interpreted by me.  O2 saturation:    Counseling: Discussed the results above with the patient regarding need for admission or discharge.  Patient understands and agrees plan of care.      -----  ED Disposition       ED Disposition   Discharge    Condition   Stable    Comment   --             Final diagnoses:   Acute bilateral low back pain without sciatica      Your Follow-Up Providers       Natividad Thompson APRN. Schedule an appointment as soon as possible for a visit in 1 week.    Specialty: Neurology  Follow up details: As needed, If symptoms worsen  [address] [place] [id number] [phone]                       Contact information for after-discharge care    Follow-up information has not been specified.                    Your medication list        START taking these medications        Instructions Last Dose Given Next Dose Due   cyclobenzaprine 10 MG tablet  Commonly known as: FLEXERIL      Take 1 tablet by mouth 3 (Three) Times a Day As Needed for Muscle Spasms for up to 5 days.              CONTINUE taking these medications        Instructions Last Dose Given Next Dose Due   gabapentin 300 MG capsule  Commonly known as: NEURONTIN           hyoscyamine 0.125 MG tablet  Commonly known as: ANASPAZ,LEVSIN      Take 1 tablet by mouth 4 (Four) Times a Day With Meals & at Bedtime.       omeprazole 40 MG  capsule  Commonly known as: priLOSEC      Take 1 capsule by mouth Daily.       oxyCODONE-acetaminophen  MG per tablet  Commonly known as: PERCOCET      Take 1 tablet by mouth Every 6 (Six) Hours As Needed for Moderate Pain.       Sodium Sulfate-Mag Sulfate-KCl [id number] MG tablet  Commonly known as: SUTAB      Take 1 kit by mouth Take As Directed.       sodium-potassium-magnesium sulfates 17.5-3.13-1.6 GM/177ML solution oral solution  Commonly known as: Suprep Bowel Prep Kit      Take 1 bottle by mouth Take As Directed. Follow instructions that were mailed to your home. If you didn't receive these call [phone].                 Where to Get Your Medications        Information about where to get these medications is not yet available    Ask your nurse or doctor about these medications  cyclobenzaprine 10 MG tablet

## 2024-07-15 ENCOUNTER — LAB (OUTPATIENT)
Dept: LAB | Facility: HOSPITAL | Age: 54
End: 2024-07-15
Payer: COMMERCIAL

## 2024-07-15 ENCOUNTER — OFFICE VISIT (OUTPATIENT)
Dept: GYNECOLOGIC ONCOLOGY | Facility: CLINIC | Age: 54
End: 2024-07-15
Payer: COMMERCIAL

## 2024-07-15 VITALS
WEIGHT: 194.5 LBS | TEMPERATURE: 97.1 F | OXYGEN SATURATION: 99 % | BODY MASS INDEX: 31.26 KG/M2 | RESPIRATION RATE: 18 BRPM | HEIGHT: 66 IN | DIASTOLIC BLOOD PRESSURE: 80 MMHG | SYSTOLIC BLOOD PRESSURE: 128 MMHG | HEART RATE: 64 BPM

## 2024-07-15 DIAGNOSIS — Z85.43 HISTORY OF OVARIAN CANCER: ICD-10-CM

## 2024-07-15 DIAGNOSIS — Z15.02 MONOALLELIC MUTATION OF CHEK2 GENE IN FEMALE PATIENT: Primary | ICD-10-CM

## 2024-07-15 DIAGNOSIS — Z15.01 MONOALLELIC MUTATION OF CHEK2 GENE IN FEMALE PATIENT: Primary | ICD-10-CM

## 2024-07-15 DIAGNOSIS — Z15.89 MONOALLELIC MUTATION OF CHEK2 GENE IN FEMALE PATIENT: Primary | ICD-10-CM

## 2024-07-15 DIAGNOSIS — Z91.89 AT HIGH RISK FOR BREAST CANCER: ICD-10-CM

## 2024-07-15 DIAGNOSIS — Z15.09 MONOALLELIC MUTATION OF CHEK2 GENE IN FEMALE PATIENT: Primary | ICD-10-CM

## 2024-07-15 LAB — CANCER AG125 SERPL QL: 18.6 U/ML (ref 0–38.1)

## 2024-07-15 PROCEDURE — 99213 OFFICE O/P EST LOW 20 MIN: CPT | Performed by: NURSE PRACTITIONER

## 2024-07-15 PROCEDURE — 36415 COLL VENOUS BLD VENIPUNCTURE: CPT

## 2024-07-15 PROCEDURE — 86304 IMMUNOASSAY TUMOR CA 125: CPT

## 2024-07-16 ENCOUNTER — TELEPHONE (OUTPATIENT)
Dept: GYNECOLOGIC ONCOLOGY | Facility: CLINIC | Age: 54
End: 2024-07-16
Payer: COMMERCIAL

## 2024-07-16 NOTE — TELEPHONE ENCOUNTER
----- Message from Glenys Lyon sent at 7/16/2024 12:36 AM EDT -----  Please call and notify that CA-125 level remains low/ stable. Also, see if she ever saw specialist at [facility] (colorectal surgeon) following colonoscopy in 9/2022 by Dr Thomas. If so, please request records. She was previously scheduled 10-18-22 with Dr Van.

## 2024-08-19 ENCOUNTER — HOSPITAL ENCOUNTER (OUTPATIENT)
Dept: MRI IMAGING | Facility: HOSPITAL | Age: 54
Discharge: HOME | End: 2024-08-19
Admitting: NURSE PRACTITIONER
Payer: COMMERCIAL

## 2024-08-19 DIAGNOSIS — Z91.89 AT HIGH RISK FOR BREAST CANCER: ICD-10-CM

## 2024-08-19 PROCEDURE — A9577 INJ MULTIHANCE: HCPCS | Performed by: NURSE PRACTITIONER

## 2024-08-19 PROCEDURE — 0 GADOBENATE DIMEGLUMINE 529 MG/ML SOLUTION: Performed by: NURSE PRACTITIONER

## 2024-08-19 PROCEDURE — 77049 MRI BREAST C-+ W/CAD BI: CPT

## 2024-08-19 RX ADMIN — GADOBENATE DIMEGLUMINE 18 ML: 529 INJECTION, SOLUTION INTRAVENOUS at 12:08

## 2024-09-03 ENCOUNTER — TELEPHONE (OUTPATIENT)
Dept: GYNECOLOGIC ONCOLOGY | Facility: CLINIC | Age: 54
End: 2024-09-03
Payer: COMMERCIAL

## 2024-09-03 NOTE — TELEPHONE ENCOUNTER
RN notified patient of normal breast MRI results per APRN.  Patient verbalized understanding.     ----- Message from Glenys Lyon sent at 9/2/2024  9:04 PM EDT -----  Normal results

## 2025-07-16 ENCOUNTER — OFFICE VISIT (OUTPATIENT)
Dept: GYNECOLOGIC ONCOLOGY | Facility: CLINIC | Age: 55
End: 2025-07-16
Payer: COMMERCIAL

## 2025-07-16 VITALS
BODY MASS INDEX: 30.29 KG/M2 | TEMPERATURE: 97.5 F | RESPIRATION RATE: 17 BRPM | WEIGHT: 188.5 LBS | SYSTOLIC BLOOD PRESSURE: 149 MMHG | HEART RATE: 64 BPM | OXYGEN SATURATION: 99 % | DIASTOLIC BLOOD PRESSURE: 88 MMHG | HEIGHT: 66 IN

## 2025-07-16 DIAGNOSIS — L98.9 SKIN LESION OF CHEEK: ICD-10-CM

## 2025-07-16 DIAGNOSIS — Z15.89 MONOALLELIC MUTATION OF CHEK2 GENE IN FEMALE PATIENT: Primary | ICD-10-CM

## 2025-07-16 DIAGNOSIS — L98.9 SKIN LESION OF CHEST WALL: ICD-10-CM

## 2025-07-16 DIAGNOSIS — Z15.01 MONOALLELIC MUTATION OF CHEK2 GENE IN FEMALE PATIENT: Primary | ICD-10-CM

## 2025-07-16 DIAGNOSIS — Z85.43 HISTORY OF OVARIAN CANCER: ICD-10-CM

## 2025-07-16 DIAGNOSIS — Z15.09 MONOALLELIC MUTATION OF CHEK2 GENE IN FEMALE PATIENT: Primary | ICD-10-CM

## 2025-07-16 DIAGNOSIS — Z76.89 ENCOUNTER TO ESTABLISH CARE: ICD-10-CM

## 2025-07-16 DIAGNOSIS — B37.2 CANDIDIASIS OF SKIN: ICD-10-CM

## 2025-07-16 DIAGNOSIS — Z15.02 MONOALLELIC MUTATION OF CHEK2 GENE IN FEMALE PATIENT: Primary | ICD-10-CM

## 2025-07-16 DIAGNOSIS — Z91.89 AT HIGH RISK FOR BREAST CANCER: ICD-10-CM

## 2025-07-16 DIAGNOSIS — Z12.31 ENCOUNTER FOR SCREENING MAMMOGRAM FOR MALIGNANT NEOPLASM OF BREAST: ICD-10-CM

## 2025-07-16 RX ORDER — HYDROCORTISONE 25 MG/G
1 CREAM TOPICAL 2 TIMES DAILY PRN
Qty: 28 G | Refills: 1 | Status: SHIPPED | OUTPATIENT
Start: 2025-07-16

## 2025-07-16 RX ORDER — NYSTATIN 100000 [USP'U]/G
POWDER TOPICAL 2 TIMES DAILY PRN
Qty: 30 G | Refills: 3 | Status: SHIPPED | OUTPATIENT
Start: 2025-07-16

## 2025-07-16 NOTE — PROGRESS NOTES
GYN ONCOLOGY CANCER SURVEILLANCE FOLLOW-UP    Gertrudis Yost  [id number]  1970    Subjective   Chief Complaint: Ovarian Cancer and Follow up (high risk CHEK2)      History of present illness:   Gertrudis Yost is a 55 y.o. year old female who is here today for ongoing surveillance of Ovarian Cancer, see Cancer History.     She has a personal history of ovarian cancer. Next month she will be 8 years out from completion of treatment-- surgery by Dr Roman then adjuvant chemo (8/2017). Due to intraoperative rupture, final Stage ICI grade 2. She continues yearly pelvic exams and CA-125 checks, last negative in 5/2023. She denies any new or concerning pelvic problems.     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 She has been followed for high risk status since that time.  Her plan includes monthly self breast exams, twice yearly clinical breast exams, annual mammogram and breast MRI, and colonoscopies every 5 years.    The only thing she mentions today is ongoing itchiness of left breast/ chest. Also has a spot on left side of  face just below earlobe. Red papule. Has been there over one year. Bleeds every time she washes face.    States mmgs are covered but mri was expensive. Not covered by insurance per pt. She works at [employer]-- [employer].     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  She was scheduled at [facility], but never completed consultation.  Today, she tells me that this has not been a problem and she does not wish to see the specialist if she does not absolutely have to.    Health Maintenance:  -Regular self breast exams: no  -history of abnormal mmgs: no  -Last 1/15/2024: BI-RADS 1  - Last breast MRI 8/19/2024, BI-RADS 2, negative  -Last colonoscopy: 9- ([facility]), repeat 5 years  -h/o abnormal pap: no    Risk  Assessment: Genetic Testing: CHEK2+           Cancer History:   Oncology/Hematology History   Ovarian cancer   4/2016 Imaging    20 cm right pelvic mass seen upon CT during work-up for abdominal pain with leukocytosis and emesis. Referred to Gyn Oncology. CA-125 = 147 and CEA = 1.9     4/11/2016 Surgery    Laparotomy and debulking with Dr. Roman along with cholecystectomy by Dr. Leslie.   Cancer staging surgery included MORE/BSO, bilateral pelvic and periaortic lymph node dissection, omentectomy, appendectomy, and peritoneal stripping. There was intra-operative spill of the right ovarian mass during surgery. 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  Washings negative for malignant cells.  No LVSI, no ovarian surface involvement, omentum negative, and all sampled nodes negative.  Due to intraoperative rupture, final Stage ICI grade 2     5/2/2016 Surgery    Port-A-Cath placement       5/11/2016 - 8/31/2016 Chemotherapy    Carbo/Taxol every 3 weeks × 6 cycles     10/25/2016 Imaging    Posttreatment CT scan showed only a small node in the left external iliac chain not meeting criteria for malignant adenopathy, unchanged from preop scan.  Study otherwise negative.     9/7/2017 Surgery    Port-A-Cath removal       9/2017 Genetic Testing    Genetic testing was positive for CHEK2 gene (c.470T>C)           The current medication list and allergy list were reviewed and reconciled.     Past Medical History, Past Surgical History, Social History, Family History have been reviewed and are without significant changes except as mentioned.      Review of Systems   Constitutional: Negative.    Gastrointestinal: Negative.    Genitourinary: Negative.    Skin:  Positive for rash.   Psychiatric/Behavioral: Negative.             Objective   Physical Exam  Skin:     Findings: Erythema (dry, red, irritated patch of skin on left chest wall that is heart shaped), lesion  "(left face under earlobe, small red papule) and rash (There is a line of increased moisture and redness along skin folds underneath breasts bilaterally. skin intact) present.       Vital Signs: /88   Pulse 64   Temp 97.5 °F (36.4 °C) (Temporal)   Resp 17   Ht 167.6 cm (65.98\")   Wt 85.5 kg (188 lb 8 oz)   SpO2 99%   BMI 30.44 kg/m²   Vitals:    07/16/25 1127   PainSc: 0-No pain           General Appearance:  alert, cooperative, no apparent distress, appears stated age, and obese by BMI criteria   Neurologic/Psych: A&O x 3, gait steady, appropriate affect   HEENT:  Normocephalic, without obvious abnormality, mucous membranes moist   Abdomen:   Soft, non-tender, non-distended, and no organomegaly   Lymph nodes: No cervical, supraclavicular, inguinal adenopathy noted   Pelvic: External genitalia are free from lesion.  On speculum examination, the vaginal cuff was intact and no lesions were appreciated.  On bimanual examination, no fullness was appreciated.  Uterus, cervix, and adnexa were absent.  There was no significant tenderness.  The rectovaginal exam was deferred.       ECOG score: 0             Result Review :    4-, last imaging     Tumor marker:     Date Value Ref Range Status   07/15/2024 18.6 0.0 - 38.1 U/mL Final   05/09/2023 14.0 0.0 - 38.1 U/mL Final   11/08/2022 17.9 0.0 - 38.1 U/mL Final   04/22/2022 13.9 0.0 - 38.1 U/mL Final                 Assessment and Plan:   This is a 55-year-old lady who presents today for both her cancer surveillance visit (h/o ovarian ca) and high risk visit (CHEK2+).    Ovarian cancer, mucinous adenocarcinoma stage I C1 grade 2  - Cancer history as noted above  - She remains free of any clinical evidence of disease.  Following  level which remains low and stable. Due to repeat today.  -She is understanding to call with any changes in pelvic symptoms or general GYN concerns at any time between regularly scheduled visits.     High Risk, CHEK2+  - " CBE unremarkable today.  She will follow-up with Krysten in the high-risk clinic in 6 months for repeat clinical breast exam.  We will alternate appointments.  - Mammogram overdue, order placed  - Breast MRI due after 8/19/2025.  However, I advised that in the event mammogram is normal, plan to complete breast MRI 6 months after that which will be around 1/2026.  Then continue alternating imaging every 6 months   -I would be surprised if her breast MRI was not covered given high risk status and [employer] insurance through the post office.  We can double check, she may still have a high out-of-pocket cost.  But either way she can be informed of this amount prior to scheduling her completing the imaging study.  -Colonoscopy due 2027  - Declines chemoprevention    Diagnoses and all orders for this visit:    1. 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 History of ovarian cancer  -     ; Future    3. At high risk for breast cancer  -     Mammo Screening Digital Tomosynthesis Bilateral With CAD; Future    4. Skin lesion of cheek  -     hydrocortisone 2.5 % cream; Apply 1 Application topically to the appropriate area as directed 2 (Two) Times a Day As Needed for Rash.  Dispense: 28 g; Refill: 1  -     Ambulatory Referral to Dermatology    5. Skin lesion of chest wall  -     Ambulatory Referral to Dermatology    6. Encounter to establish care  -     Ambulatory Referral to Dermatology    7. Candidiasis of skin  -     nystatin (MYCOSTATIN) 250257 UNIT/GM powder; Apply  topically to the appropriate area as directed 2 (Two) Times a Day As Needed (irritation under skin folds).  Dispense: 30 g; Refill: 3    8. Encounter for screening mammogram for malignant neoplasm of breast  -     Mammo Screening Digital Tomosynthesis Bilateral With CAD; Future    Monthly breast exams starting at age 18.   Clinical breast exam every 6 months.  Annual mammogram starting at age 40, or earlier based upon family  history.  Annual breast MRI starting at age 40.  Consider chemoprevention for breast cancer risk reduction (Tamoxifen or Raloxifene).  Consider risk-reducing mastectomy.  Colonoscopies every 5 years starting at age 40, or earlier based upon family history.     Today I have spent a total of 50 minutes caring for Gertrudis Yost.  This includes time spent preparing for the visit, reviewing tests, performing a medically appropriate examination and/or evaluation , counseling and educating the patient/family/caregiver, ordering medications, tests, or procedures and documenting information in the medical record.   Pain assessment was performed today as a part of patient’s care.  For patients with pain related to surgery, gynecologic malignancy or cancer treatment, the plan is as noted in the assessment/plan.  For patients with pain not related to these issues, they are to seek any further needed care from a more appropriate provider, such as PCP.    Follow-up:     -6 months in high risk clinic with NELSON Bashir  -1 year with me in gyn onc for surveillance & high risk visit    Electronically signed by NELSON Quesada on 07/16/2025

## 2025-08-02 LAB
NCCN CRITERIA FLAG: ABNORMAL
TYRER CUZICK SCORE: 11.2

## 2025-08-04 ENCOUNTER — DOCUMENTATION (OUTPATIENT)
Dept: GENETICS | Facility: HOSPITAL | Age: 55
End: 2025-08-04
Payer: COMMERCIAL

## 2025-08-04 ENCOUNTER — RESULTS FOLLOW-UP (OUTPATIENT)
Facility: HOSPITAL | Age: 55
End: 2025-08-04
Payer: COMMERCIAL

## 2025-08-07 ENCOUNTER — HOSPITAL ENCOUNTER (OUTPATIENT)
Dept: MAMMOGRAPHY | Facility: HOSPITAL | Age: 55
Discharge: HOME | End: 2025-08-07
Admitting: NURSE PRACTITIONER
Payer: COMMERCIAL

## 2025-08-07 DIAGNOSIS — Z12.31 ENCOUNTER FOR SCREENING MAMMOGRAM FOR MALIGNANT NEOPLASM OF BREAST: ICD-10-CM

## 2025-08-07 DIAGNOSIS — Z91.89 AT HIGH RISK FOR BREAST CANCER: ICD-10-CM

## 2025-08-07 PROCEDURE — 77063 BREAST TOMOSYNTHESIS BI: CPT

## 2025-08-07 PROCEDURE — 77067 SCR MAMMO BI INCL CAD: CPT

## 2025-08-11 ENCOUNTER — TELEPHONE (OUTPATIENT)
Dept: GYNECOLOGIC ONCOLOGY | Facility: CLINIC | Age: 55
End: 2025-08-11
Payer: COMMERCIAL

## (undated) DEVICE — CVR HNDL LIGHT RIGID

## (undated) DEVICE — GLV SURG SENSICARE PI MIC PF SZ7.5 LF STRL

## (undated) DEVICE — SPNG GZ WOVN 4X4IN 12PLY 10/BX STRL

## (undated) DEVICE — PK SOL VISC ESOPH 80ML

## (undated) DEVICE — BNDR ABD PREMIUM/UNIV 10IN 27TO48IN

## (undated) DEVICE — PENCL ROCKRSWCH MEGADYNE W/HOLSTR 10FT SS

## (undated) DEVICE — ANTIBACTERIAL UNDYED BRAIDED (POLYGLACTIN 910), SYNTHETIC ABSORBABLE SUTURE: Brand: COATED VICRYL

## (undated) DEVICE — GLV SURG SENSICARE PI MIC PF SZ7 LF STRL

## (undated) DEVICE — 3M™ IOBAN™ 2 ANTIMICROBIAL INCISE DRAPE 6650EZ: Brand: IOBAN™ 2

## (undated) DEVICE — SUT NUROLON 0 MO7 CR8 18IN C541D

## (undated) DEVICE — DRSNG SURESITE WNDW 4X4.5

## (undated) DEVICE — PATIENT RETURN ELECTRODE, SINGLE-USE, CONTACT QUALITY MONITORING, ADULT, WITH 9FT CORD, FOR PATIENTS WEIGING OVER 33LBS. (15KG): Brand: MEGADYNE

## (undated) DEVICE — PK MINOR SPLT 10

## (undated) DEVICE — GOWN,PREVENTION PLUS,XXLARGE,STERILE: Brand: MEDLINE